# Patient Record
Sex: FEMALE | Race: BLACK OR AFRICAN AMERICAN | Employment: UNEMPLOYED | URBAN - METROPOLITAN AREA
[De-identification: names, ages, dates, MRNs, and addresses within clinical notes are randomized per-mention and may not be internally consistent; named-entity substitution may affect disease eponyms.]

---

## 2017-01-11 ENCOUNTER — APPOINTMENT (OUTPATIENT)
Dept: GENERAL RADIOLOGY | Age: 32
End: 2017-01-11
Attending: PHYSICIAN ASSISTANT
Payer: MEDICAID

## 2017-01-11 ENCOUNTER — HOSPITAL ENCOUNTER (EMERGENCY)
Age: 32
Discharge: LEFT BEFORE DISCHARGE | End: 2017-01-12
Attending: EMERGENCY MEDICINE
Payer: MEDICAID

## 2017-01-11 VITALS
OXYGEN SATURATION: 99 % | BODY MASS INDEX: 33.58 KG/M2 | TEMPERATURE: 98.2 F | WEIGHT: 177.7 LBS | RESPIRATION RATE: 17 BRPM | HEART RATE: 87 BPM | SYSTOLIC BLOOD PRESSURE: 111 MMHG | DIASTOLIC BLOOD PRESSURE: 76 MMHG

## 2017-01-11 LAB
FLUAV AG NPH QL IA: NEGATIVE
FLUBV AG NOSE QL IA: NEGATIVE

## 2017-01-11 PROCEDURE — 87804 INFLUENZA ASSAY W/OPTIC: CPT | Performed by: EMERGENCY MEDICINE

## 2017-01-11 PROCEDURE — 71020 XR CHEST PA LAT: CPT

## 2017-01-11 PROCEDURE — 75810000275 HC EMERGENCY DEPT VISIT NO LEVEL OF CARE

## 2017-01-12 NOTE — ED TRIAGE NOTES
Pt states she has a cold since Sunday. Worried about flu due to body aches and headaches.   States she thinks she feels warm

## 2017-06-04 ENCOUNTER — HOSPITAL ENCOUNTER (EMERGENCY)
Age: 32
Discharge: HOME | End: 2017-06-04
Attending: EMERGENCY MEDICINE
Payer: MEDICAID

## 2017-06-04 VITALS
BODY MASS INDEX: 32.1 KG/M2 | OXYGEN SATURATION: 100 % | WEIGHT: 170 LBS | HEART RATE: 91 BPM | RESPIRATION RATE: 16 BRPM | DIASTOLIC BLOOD PRESSURE: 88 MMHG | HEIGHT: 61 IN | TEMPERATURE: 98.5 F | SYSTOLIC BLOOD PRESSURE: 128 MMHG

## 2017-06-04 DIAGNOSIS — H10.33 ACUTE BACTERIAL CONJUNCTIVITIS OF BOTH EYES: Primary | ICD-10-CM

## 2017-06-04 PROCEDURE — 99282 EMERGENCY DEPT VISIT SF MDM: CPT

## 2017-06-04 RX ORDER — ERYTHROMYCIN 5 MG/G
OINTMENT OPHTHALMIC
Qty: 1 TUBE | Refills: 0 | Status: SHIPPED | OUTPATIENT
Start: 2017-06-04 | End: 2017-06-11

## 2017-06-04 NOTE — LETTER
NOTIFICATION OF RETURN TO WORK / SCHOOL 
 
6/4/2017 Ms. Caroline Grullon [address] [facility] [id number] To Whom It May Concern: 
 
Caroline Grullon was seen in the ED on 6/4/17 and may be excused from work through 6/6/17. Sincerely, April [place], [place]

## 2017-06-05 NOTE — ED PROVIDER NOTES
HPI Comments: [id number] [address] Ortez yr old female presents to the ED complaining of bilateral eye pain and redness since earlier today. Pt denies wearing contact lenses. Denies tx at home. No other complaints. Patient is a [age] Matthew Ortez y.o. female presenting with eye pain. Eye Pain    Associated symptoms include discharge, eye redness, negative and pain. Pertinent negatives include no numbness, no nausea, no vomiting, no weakness, no fever and no dizziness. Past Medical History:   Diagnosis Date    Dental caries     Hypertension     Insect bite     Migraine     Sore throat     Strep pharyngitis     Tooth ache        Past Surgical History:   Procedure Laterality Date    HX  SECTION           Family History:   Problem Relation Age of Onset    Cancer Other     Diabetes Other     Hypertension Other        Social History     Social History    Marital status: SINGLE     Spouse name: N/A    Number of children: N/A    Years of education: N/A     Occupational History    Not on file. Social History Main Topics    Smoking status: Former Smoker    Smokeless tobacco: Never Used    Alcohol use No    Drug use: No    Sexual activity: Yes     Birth control/ protection: None     Other Topics Concern    Not on file     Social History Narrative         ALLERGIES: Review of patient's allergies indicates no known allergies. Review of Systems   Constitutional: Negative. Negative for chills, diaphoresis, fatigue and fever. HENT: Negative. Negative for congestion, ear pain, rhinorrhea and sore throat. Eyes: Positive for pain, discharge and redness. Negative for visual disturbance. Respiratory: Negative. Negative for cough, shortness of breath, wheezing and stridor. Cardiovascular: Negative. Negative for chest pain, palpitations and leg swelling. Gastrointestinal: Negative. Negative for abdominal pain, constipation, diarrhea, nausea and vomiting. Endocrine: Negative. Genitourinary: Negative. Negative for dysuria, flank pain, frequency and hematuria. Musculoskeletal: Negative. Negative for back pain, myalgias, neck pain and neck stiffness. Skin: Negative. Negative for rash and wound. Neurological: Negative. Negative for dizziness, seizures, syncope, weakness, light-headedness, numbness and headaches. Hematological: Negative. Psychiatric/Behavioral: Negative. All other systems reviewed and are negative. There were no vitals filed for this visit. Physical Exam   Constitutional: She is oriented to person, place, and time. She appears well-developed and well-nourished. No distress. HENT:   Head: Normocephalic. Eyes: EOM are normal. Pupils are equal, round, and reactive to light. Right eye exhibits discharge. Left eye exhibits discharge. No scleral icterus. Conjunctiva erythematous bilaterally, bilateral purulent discharge noted   Neck: Normal range of motion. Neck supple. Cardiovascular: Normal rate, regular rhythm and normal heart sounds. Exam reveals no gallop and no friction rub. No murmur heard. Pulmonary/Chest: Effort normal and breath sounds normal. No stridor. No respiratory distress. She has no wheezes. She has no rales. Musculoskeletal: Normal range of motion. Neurological: She is alert and oriented to person, place, and time. Coordination normal.   Gait is steady. Able to ambulate without difficulty. Skin: Skin is warm and dry. No rash noted. She is not diaphoretic. No erythema. Psychiatric: She has a normal mood and affect. Her behavior is normal. Thought content normal.   Nursing note and vitals reviewed. MDM  Number of Diagnoses or Management Options  Diagnosis management comments: Impression:  Conjunctivitis    Vision 20/20    Patient is stable for discharge at this time. Rx for erythromycin given. Rest and follow-up with PCP this week. Return to the ED immediately for any new or worsening sx.   Lois Maciel PA-C 8:25 PM     Risk of Complications, Morbidity, and/or Mortality  Presenting problems: low  Diagnostic procedures: low  Management options: low    Patient Progress  Patient progress: stable    ED Course       Procedures

## 2017-06-05 NOTE — ED TRIAGE NOTES
C/o irritated eyes with discharge onset today. States wearing false lashes prior to onset of symptoms.

## 2017-06-05 NOTE — DISCHARGE INSTRUCTIONS
Pinkeye: Care Instructions  Your Care Instructions    Pinkeye is redness and swelling of the eye surface and the conjunctiva (the lining of the eyelid and the covering of the white part of the eye). Pinkeye is also called conjunctivitis. Pinkeye is often caused by infection with bacteria or a virus. Dry air, allergies, smoke, and chemicals are other common causes. Pinkeye often clears on its own in 7 to 10 days. Antibiotics only help if the pinkeye is caused by bacteria. Pinkeye caused by infection spreads easily. If an allergy or chemical is causing pinkeye, it will not go away unless you can avoid whatever is causing it. Follow-up care is a key part of your treatment and safety. Be sure to make and go to all appointments, and call your doctor if you are having problems. Its also a good idea to know your test results and keep a list of the medicines you take. How can you care for yourself at home? · Wash your hands often. Always wash them before and after you treat pinkeye or touch your eyes or face. · Use moist cotton or a clean, wet cloth to remove crust. Wipe from the inside corner of the eye to the outside. Use a clean part of the cloth for each wipe. · Put cold or warm wet cloths on your eye a few times a day if the eye hurts. · Do not wear contact lenses or eye makeup until the pinkeye is gone. Throw away any eye makeup you were using when you got pinkeye. Clean your contacts and storage case. If you wear disposable contacts, use a new pair when your eye has cleared and it is safe to wear contacts again. · If the doctor gave you antibiotic ointment or eyedrops, use them as directed. Use the medicine for as long as instructed, even if your eye starts looking better soon. Keep the bottle tip clean, and do not let it touch the eye area. · To put in eyedrops or ointment:  ¨ Tilt your head back, and pull your lower eyelid down with one finger.   ¨ Drop or squirt the medicine inside the lower lid.  ¨ Close your eye for 30 to 60 seconds to let the drops or ointment move around. ¨ Do not touch the ointment or dropper tip to your eyelashes or any other surface. · Do not share towels, pillows, or washcloths while you have pinkeye. When should you call for help? Call your doctor now or seek immediate medical care if:  · You have pain in your eye, not just irritation on the surface. · You have a change in vision or loss of vision. · You have an increase in discharge from the eye. · Your eye has not started to improve or begins to get worse within 48 hours after you start using antibiotics. · Pinkeye lasts longer than 7 days. Watch closely for changes in your health, and be sure to contact your doctor if you have any problems. Where can you learn more? Go to http://camelia-[email or web address].info/. Enter Y392 in the search box to learn more about \"Pinkeye: Care Instructions. \"  Current as of: May 27, 2016  Content Version: 11.2  © 6498-6721 [employer]. Care instructions adapted under license by [employer] (which disclaims liability or warranty for this information). If you have questions about a medical condition or this instruction, always ask your healthcare professional. [address] any warranty or liability for your use of this information. wildcraft Activation    Thank you for requesting access to wildcraft. Please follow the instructions below to securely access and download your online medical record. wildcraft allows you to send messages to your doctor, view your test results, renew your prescriptions, schedule appointments, and more. How Do I Sign Up? 1. In your internet browser, go to [email or web address]  2. Click on the First Time User? Click Here link in the Sign In box. You will be redirect to the New Member Sign Up page. 3. Enter your wildcraft Access Code exactly as it appears below.  You will not need to use this code after youve completed the sign-up process. If you do not sign up before the expiration date, you must request a new code. StellaService Access Code: [email or web address]  Expires: 2017  8:27 PM (This is the date your StellaService access code will )    4. Enter the last four digits of your Social Security Number (xxxx) and Date of Birth (mm/dd/yyyy) as indicated and click Submit. You will be taken to the next sign-up page. 5. Create a StellaService ID. This will be your StellaService login ID and cannot be changed, so think of one that is secure and easy to remember. 6. Create a StellaService password. You can change your password at any time. 7. Enter your Password Reset Question and Answer. This can be used at a later time if you forget your password. 8. Enter your e-mail address. You will receive e-mail notification when new information is available in [address]. 9. Click Sign Up. You can now view and download portions of your medical record. 10. Click the Download Summary menu link to download a portable copy of your medical information. Additional Information    If you have questions, please visit the Frequently Asked Questions section of the StellaService website at [email or web address] Remember, StellaService is NOT to be used for urgent needs. For medical emergencies, dial 911.

## 2017-06-08 ENCOUNTER — HOSPITAL ENCOUNTER (EMERGENCY)
Age: 32
Discharge: HOME | End: 2017-06-09
Attending: EMERGENCY MEDICINE
Payer: MEDICAID

## 2017-06-08 VITALS
RESPIRATION RATE: 18 BRPM | BODY MASS INDEX: 32.12 KG/M2 | DIASTOLIC BLOOD PRESSURE: 97 MMHG | SYSTOLIC BLOOD PRESSURE: 153 MMHG | OXYGEN SATURATION: 99 % | TEMPERATURE: 98.3 F | WEIGHT: 170 LBS | HEART RATE: 72 BPM

## 2017-06-08 DIAGNOSIS — J06.9 ACUTE UPPER RESPIRATORY INFECTION: Primary | ICD-10-CM

## 2017-06-08 DIAGNOSIS — J02.9 PHARYNGITIS, UNSPECIFIED ETIOLOGY: ICD-10-CM

## 2017-06-08 DIAGNOSIS — K02.9 DENTAL CARIES: ICD-10-CM

## 2017-06-08 PROCEDURE — 87077 CULTURE AEROBIC IDENTIFY: CPT | Performed by: EMERGENCY MEDICINE

## 2017-06-08 PROCEDURE — 87081 CULTURE SCREEN ONLY: CPT | Performed by: EMERGENCY MEDICINE

## 2017-06-08 PROCEDURE — 99282 EMERGENCY DEPT VISIT SF MDM: CPT

## 2017-06-09 RX ORDER — AMOXICILLIN 500 MG/1
500 TABLET, FILM COATED ORAL 3 TIMES DAILY
Qty: 30 TAB | Refills: 0 | Status: SHIPPED | OUTPATIENT
Start: 2017-06-09 | End: 2017-08-31

## 2017-06-09 RX ORDER — FLUTICASONE PROPIONATE 50 MCG
2 SPRAY, SUSPENSION (ML) NASAL DAILY
Qty: 1 BOTTLE | Refills: 0 | Status: SHIPPED | OUTPATIENT
Start: 2017-06-09 | End: 2017-08-31

## 2017-06-09 NOTE — DISCHARGE INSTRUCTIONS
Shopcliq Activation    Thank you for requesting access to Shopcliq. Please follow the instructions below to securely access and download your online medical record. Shopcliq allows you to send messages to your doctor, view your test results, renew your prescriptions, schedule appointments, and more. How Do I Sign Up? 1. In your internet browser, go to [email or web address]  2. Click on the First Time User? Click Here link in the Sign In box. You will be redirect to the New Member Sign Up page. 3. Enter your Shopcliq Access Code exactly as it appears below. You will not need to use this code after youve completed the sign-up process. If you do not sign up before the expiration date, you must request a new code. Shopcliq Access Code: [email or web address]  Expires: 2017  8:27 PM (This is the date your Shopcliq access code will )    4. Enter the last four digits of your Social Security Number (xxxx) and Date of Birth (mm/dd/yyyy) as indicated and click Submit. You will be taken to the next sign-up page. 5. Create a Shopcliq ID. This will be your Shopcliq login ID and cannot be changed, so think of one that is secure and easy to remember. 6. Create a Shopcliq password. You can change your password at any time. 7. Enter your Password Reset Question and Answer. This can be used at a later time if you forget your password. 8. Enter your e-mail address. You will receive e-mail notification when new information is available in [address] 9. Click Sign Up. You can now view and download portions of your medical record. 10. Click the Download Summary menu link to download a portable copy of your medical information. Additional Information    If you have questions, please visit the Frequently Asked Questions section of the Shopcliq website at [email or web address] Remember, Shopcliq is NOT to be used for urgent needs. For medical emergencies, dial 911.

## 2017-06-09 NOTE — ED PROVIDER NOTES
HPI Comments: 28 yr old female presents to the ED complaining of a sore throat for the past few days and intermittent dental pain in the lower L molar region for the past several months. Denies fever, chills, SOB, and chest pain. Pt reports some mild nasal congestion. No other complaints. Patient is a 28 y.o. female presenting with sore throat and dental problem. Sore Throat    Associated symptoms include congestion. Pertinent negatives include no diarrhea, no vomiting, no ear pain, no headaches, no shortness of breath, no stridor and no cough. Dental Pain             Past Medical History:   Diagnosis Date    Dental caries     Hypertension     Insect bite     Migraine     Sore throat     Strep pharyngitis     Tooth ache        Past Surgical History:   Procedure Laterality Date    HX  SECTION           Family History:   Problem Relation Age of Onset    Cancer Other     Diabetes Other     Hypertension Other        Social History     Social History    Marital status: SINGLE     Spouse name: N/A    Number of children: N/A    Years of education: N/A     Occupational History    Not on file. Social History Main Topics    Smoking status: Former Smoker    Smokeless tobacco: Never Used    Alcohol use No    Drug use: No    Sexual activity: Yes     Birth control/ protection: None     Other Topics Concern    Not on file     Social History Narrative         ALLERGIES: Review of patient's allergies indicates no known allergies. Review of Systems   Constitutional: Negative. Negative for chills, diaphoresis, fatigue and fever. HENT: Positive for congestion, dental problem and sore throat. Negative for ear pain and rhinorrhea. Eyes: Negative. Negative for pain, redness and visual disturbance. Respiratory: Negative. Negative for cough, shortness of breath, wheezing and stridor. Cardiovascular: Negative. Negative for chest pain, palpitations and leg swelling.    Gastrointestinal: Negative. Negative for abdominal pain, constipation, diarrhea, nausea and vomiting. Endocrine: Negative. Genitourinary: Negative. Negative for dysuria, flank pain, frequency and hematuria. Musculoskeletal: Negative. Negative for back pain, myalgias, neck pain and neck stiffness. Skin: Negative. Negative for rash and wound. Allergic/Immunologic: Negative. Neurological: Negative. Negative for dizziness, seizures, syncope, weakness, light-headedness, numbness and headaches. Hematological: Negative. Psychiatric/Behavioral: Negative. All other systems reviewed and are negative. Vitals:    06/08/17 2344   BP: (!) 153/97   Pulse: 72   Resp: 18   Temp: 98.3 °F (36.8 °C)   SpO2: 99%   Weight: 77.1 kg (170 lb)            Physical Exam   Constitutional: She is oriented to person, place, and time. She appears well-developed and well-nourished. No distress. HENT:   Head: Normocephalic. Mouth/Throat: No oropharyngeal exudate. Oropharynx erythematous without exudates, airway is patent, able to clear secretions, dental caries noted in the lower L molar region without definite abscess noted. Neck: Normal range of motion. Neck supple. Cardiovascular: Normal rate, regular rhythm and normal heart sounds. Exam reveals no gallop and no friction rub. No murmur heard. Pulmonary/Chest: Effort normal and breath sounds normal. No stridor. No respiratory distress. She has no wheezes. She has no rales. Musculoskeletal: Normal range of motion. Neurological: She is alert and oriented to person, place, and time. Coordination normal.   Gait is steady. Able to ambulate without difficulty. Skin: Skin is warm and dry. No rash noted. She is not diaphoretic. No erythema. Psychiatric: She has a normal mood and affect. Her behavior is normal. Thought content normal.   Nursing note and vitals reviewed.        MDM  Number of Diagnoses or Management Options  Diagnosis management comments: Impression: Pharyngitis, dental caries, URI    RST negative    Patient is stable for discharge at this time. Rx for amoxicillin and flonase given. Rest and follow-up with PCP this week. Return to the ED immediately for any new or worsening sx.   Lois Maciel PA-C 12:34 AM        Amount and/or Complexity of Data Reviewed  Clinical lab tests: reviewed and ordered    Risk of Complications, Morbidity, and/or Mortality  Presenting problems: low  Diagnostic procedures: low  Management options: low    Patient Progress  Patient progress: stable    ED Course       Procedures

## 2017-06-11 LAB
B-HEM STREP THROAT QL CULT: NEGATIVE
BACTERIA SPEC CULT: ABNORMAL
BACTERIA SPEC CULT: ABNORMAL
SERVICE CMNT-IMP: ABNORMAL

## 2017-08-31 ENCOUNTER — HOSPITAL ENCOUNTER (EMERGENCY)
Age: 32
Discharge: HOME | End: 2017-08-31
Attending: EMERGENCY MEDICINE
Payer: MEDICAID

## 2017-08-31 VITALS
OXYGEN SATURATION: 100 % | HEIGHT: 61 IN | BODY MASS INDEX: 33.23 KG/M2 | HEART RATE: 90 BPM | WEIGHT: 176 LBS | RESPIRATION RATE: 18 BRPM | TEMPERATURE: 98.6 F | DIASTOLIC BLOOD PRESSURE: 90 MMHG | SYSTOLIC BLOOD PRESSURE: 140 MMHG

## 2017-08-31 DIAGNOSIS — N63.10 PAINFUL LUMPY RIGHT BREAST: Primary | ICD-10-CM

## 2017-08-31 DIAGNOSIS — N64.4 PAINFUL LUMPY RIGHT BREAST: Primary | ICD-10-CM

## 2017-08-31 PROCEDURE — 99282 EMERGENCY DEPT VISIT SF MDM: CPT

## 2017-08-31 RX ORDER — IBUPROFEN 800 MG/1
800 TABLET ORAL
Qty: 20 TAB | Refills: 0 | Status: SHIPPED | OUTPATIENT
Start: 2017-08-31 | End: 2017-09-07

## 2017-08-31 NOTE — ED PROVIDER NOTES
HPI Comments: Pt is a 27 yo female with a PMH of HTN presents to the ED for right breast pain. Pt noted a lumpy area in the right breast 6 days ago, and then the area became painful the next day. She saw her PCP 4 days ago, was examined, and is scheduled for a mammogram and ultrasound of the breast on 2017. She is here today because she is worried and wanted to be \"checked out again\". 8/10 pain in the breast. She has not tried anything for the pain. Pt denies any fever, chills, breast erythema/skin changes/warmth or discharge. Patient is a 28 y.o. female presenting with breast pain. The history is provided by the patient. Breast pain           Past Medical History:   Diagnosis Date    Dental caries     Hypertension     Insect bite     Migraine     Sore throat     Strep pharyngitis     Tooth ache        Past Surgical History:   Procedure Laterality Date    HX  SECTION           Family History:   Problem Relation Age of Onset    Cancer Other     Diabetes Other     Hypertension Other        Social History     Social History    Marital status: SINGLE     Spouse name: N/A    Number of children: N/A    Years of education: N/A     Occupational History    Not on file. Social History Main Topics    Smoking status: Former Smoker    Smokeless tobacco: Never Used    Alcohol use No    Drug use: No    Sexual activity: Yes     Birth control/ protection: None     Other Topics Concern    Not on file     Social History Narrative         ALLERGIES: Review of patient's allergies indicates no known allergies. Review of Systems   Constitutional: Negative for chills and fever. HENT: Negative for ear pain, rhinorrhea and sore throat. Eyes: Negative for pain and redness. Respiratory: Negative for cough and shortness of breath. Cardiovascular: Negative for chest pain. Gastrointestinal: Negative for abdominal pain, constipation, diarrhea, nausea and vomiting.    Genitourinary: Negative for dysuria. Breast pain   Musculoskeletal: Negative for gait problem, myalgias, neck pain and neck stiffness. Skin: Negative. Neurological: Negative for dizziness, light-headedness and headaches. Psychiatric/Behavioral: Negative. Vitals:    08/31/17 1537   BP: 140/90   Pulse: 90   Resp: 18   Temp: 98.6 °F (37 °C)   SpO2: 100%   Weight: 79.8 kg (176 lb)   Height: 5' 1\" (1.549 m)            Physical Exam   Constitutional: She is oriented to person, place, and time. She appears well-developed and well-nourished. No distress. HENT:   Head: Normocephalic and atraumatic. Right Ear: Tympanic membrane, external ear and ear canal normal.   Left Ear: Tympanic membrane, external ear and ear canal normal.   Nose: Nose normal.   Mouth/Throat: Oropharynx is clear and moist and mucous membranes are normal.   Eyes: Conjunctivae and EOM are normal. Pupils are equal, round, and reactive to light. Neck: Normal range of motion. Neck supple. Cardiovascular: Normal rate, regular rhythm, normal heart sounds and intact distal pulses. Exam reveals no gallop and no friction rub. No murmur heard. Pulmonary/Chest: Effort normal and breath sounds normal. No respiratory distress. She has no wheezes. She has no rales. Right breast exhibits tenderness. Right breast exhibits no inverted nipple, no nipple discharge and no skin change. Left breast exhibits no inverted nipple, no mass, no nipple discharge, no skin change and no tenderness. No fluctuance or induration noted. Abdominal: Soft. Bowel sounds are normal. There is no tenderness. Musculoskeletal: Normal range of motion. Neurological: She is alert and oriented to person, place, and time. Skin: Skin is warm and dry. No rash noted. She is not diaphoretic. Psychiatric: She has a normal mood and affect. Her behavior is normal. Judgment and thought content normal.   Nursing note and vitals reviewed.        MDM  Number of Diagnoses or Management Options  Painful lumpy right breast: new and requires workup  Diagnosis management comments: DDX: Fibrocystic change, Breast Cancer, Fibroadenoma, lipoma, abscess, cellulitis, intraductal papilloma    Exam is not c/w abscess or cellulitis or mastitis. Pt scheduled for mammogram and US 9/12/17. Do not feel there is an indication for emergent imagine. D/w different possible causes of breast lumps, and that her scheduled imaging is most appropriate, and stressed the importance of keeping that appt. Will refer to OBGYN as well. Pt given strict ED return precautions. Pt indicated understanding. Pt results have been reviewed with them. They have been counseled regarding diagnosis, treatment, and plan. Pt verbally conveys understanding and agreement of the signs, symptoms, diagnosis, treatment and prognosis and additionally agrees to follow up as discussed. Pt also agrees with the care-plan and conveys that all of their questions have been answered. I have also provided discharge instructions for them that include: educational information regarding their diagnosis and treatment, and list of reasons why they would want to return to the ED prior to their follow-up appointment, should their condition change. Brant Durbin PA-C 4:12 PM        Amount and/or Complexity of Data Reviewed  Review and summarize past medical records: yes  Discuss the patient with other providers: yes    Risk of Complications, Morbidity, and/or Mortality  Presenting problems: low  Diagnostic procedures: low  Management options: low    Patient Progress  Patient progress: stable    ED Course       Procedures      Diagnosis:   1. Painful lumpy right breast          Disposition: Discharge to home.      Follow-up Information     Follow up With Details Comments [facility]  As needed, If symptoms worsen [place] [id number] [phone]    [address] [place] [address] Go in 2 days Keep your imaging appointment as scheduled 9/12/2017 [facility] Nigel Parra MD Go in 1 week  [place]  [phone]            Patient's Medications   Start Taking    IBUPROFEN (MOTRIN) 800 MG TABLET    Take 1 Tab by mouth every six (6) hours as needed for Pain for up to 7 days. Continue Taking    HYDROCHLOROTHIAZIDE PO    Take  by mouth. These Medications have changed    No medications on file   Stop Taking    AMOXICILLIN 500 MG TAB    Take 500 mg by mouth three (3) times daily. FLUTICASONE (FLONASE) 50 MCG/ACTUATION NASAL SPRAY    2 Sprays by Both Nostrils route daily.

## 2017-08-31 NOTE — Clinical Note
Take motrin as prescribed as needed for pain. Keep your imaging appointment as scheduled on 9/12/2017, follow up with your primary doctor and/or OBGYN. Return for any concerns, worsening, or as needed.

## 2017-08-31 NOTE — ED TRIAGE NOTES
C/o    palpable lump to right breast that she noticed on Friday. States she saw her dr and is awaiting mammogram appt, but family is concerned and pt states that she became concerned when she started having pain to right breast  on Saturday. Denies any nipple discharge. Pt states she has also been having right lower back pain x 2 weeks. Denies injury or heavy lifting.

## 2017-12-08 NOTE — ED NOTES
Discharge instructions reviewed with patient. Patient voices understanding. Patient advised to follow up as directed on discharge instructions. Patient denies questions, needs or concerns at discharge regarding discharge instructions. Advised patient of need to update demographic information with registration prior to departing ER. Patient voiced understanding. No s/sx of distress noted. Armband removed and placed in shredder box.
Visual Acuity uncorrected:   Both 20/20, Right 20/20, left 20/20
monthly or less

## 2018-12-01 ENCOUNTER — HOSPITAL ENCOUNTER (EMERGENCY)
Age: 33
Discharge: HOME | End: 2018-12-01
Attending: EMERGENCY MEDICINE
Payer: COMMERCIAL

## 2018-12-01 VITALS
SYSTOLIC BLOOD PRESSURE: 139 MMHG | OXYGEN SATURATION: 98 % | RESPIRATION RATE: 20 BRPM | TEMPERATURE: 98.7 F | DIASTOLIC BLOOD PRESSURE: 90 MMHG | HEART RATE: 86 BPM

## 2018-12-01 DIAGNOSIS — K08.89 PAIN, DENTAL: Primary | ICD-10-CM

## 2018-12-01 PROCEDURE — 99282 EMERGENCY DEPT VISIT SF MDM: CPT

## 2018-12-01 RX ORDER — HYDROCODONE BITARTRATE AND ACETAMINOPHEN 5; 325 MG/1; MG/1
1 TABLET ORAL
Qty: 16 TAB | Refills: 0 | Status: SHIPPED | OUTPATIENT
Start: 2018-12-01 | End: 2020-08-03

## 2018-12-01 RX ORDER — AMOXICILLIN 500 MG/1
500 TABLET, FILM COATED ORAL 3 TIMES DAILY
Qty: 30 TAB | Refills: 0 | Status: SHIPPED | OUTPATIENT
Start: 2018-12-01 | End: 2018-12-11

## 2018-12-01 NOTE — ED NOTES
I have reviewed discharge instructions with the patient. The patient verbalized understanding. Patient armband removed and given to patient to take home. Patient was informed of the privacy risks if armband lost or stolen Current Discharge Medication List  
  
START taking these medications Details HYDROcodone-acetaminophen (NORCO) 5-325 mg per tablet Take 1 Tab by mouth every six (6) hours as needed for Pain. Max Daily Amount: 4 Tabs. Qty: 16 Tab, Refills: 0 Associated Diagnoses: Pain, dental  
  
amoxicillin 500 mg tab Take 500 mg by mouth three (3) times daily for 10 days. Qty: 30 Tab, Refills: 0 CONTINUE these medications which have NOT CHANGED Details HYDROCHLOROTHIAZIDE PO Take  by mouth.

## 2018-12-01 NOTE — DISCHARGE INSTRUCTIONS
Please follow-up with one of the dental clinics listed below:    [facility] [phone]  [facility] [phone]  [facility] [phone] (For Extractions Only)  [address] [phone]  [facility] [phone] [address] [phone]    Call to schedule an appointment. Periodontal Conditions: Care Instructions  Your Care Instructions    Periodontal conditions affect the gums, bone, and tissue that surround and support the teeth. The most common problems are caused by plaque. Plaque is a thin film of bacteria that sticks to teeth above and below the gum line. It can build up and harden into tartar. The bacteria in plaque and tartar can cause gum disease. Gingivitis is a disease that affects the gums (gingiva). The gums are the soft tissue that surrounds the teeth. Gingivitis causes red, swollen, tender gums that bleed easily when brushed, persistent bad breath, and sensitive teeth. Because it is not painful, many people do not get treatment when they should. Gingivitis can be reversed with good dental care. Periodontitis is a more advanced disease that affects more than the gums. The gums pull away from the teeth. This leaves deep pockets where bacteria can grow. The disease can damage the bones that support the teeth. The teeth may get loose and fall out. A periodontal condition should be treated as soon as it is found. Finding gum problems early, treating them right away, and having regular checkups bring the best results. You can treat mild periodontal conditions by brushing and flossing your teeth every day. Your dentist may prescribe a mouthwash to kill the bacteria that can damage teeth and gums. Your dentist may have you take antibiotics to treat infection from moderate periodontal disease. If your gums have pulled away from your teeth, you may need cleaning between the teeth and gums right down to the teeth roots.  This is called root planing and scaling. If you have severe periodontal disease, you may need surgery to remove diseased gum tissue or repair bone damage. Follow-up care is a key part of your treatment and safety. Be sure to make and go to all appointments, and call your dentist if you are having problems. It's also a good idea to know your test results and keep a list of the medicines you take. How can you care for yourself at home? · If your dentist prescribed antibiotics, take them as directed. Do not stop taking them just because you feel better. You need to take the full course of antibiotics. · Brush your teeth twice a day, in the morning and at night. ? Use a toothbrush with soft, rounded-end bristles and a head that is small enough to reach all parts of your teeth and mouth. Replace your toothbrush every 3 to 4 months. ? Use a fluoride toothpaste. ? Place the brush at a 45-degree angle where the teeth meet the gums. Press firmly, and gently rock the brush back and forth using small circular movements. ? Brush chewing surfaces vigorously with short back-and-forth strokes. ? Brush your tongue from back to front. · Floss at least once a day. Choose the type and flavor that you like best.  · Have your teeth cleaned by a professional at least twice a year. · Ask your dentist about using an antibacterial mouthwash to help reduce bacteria. · Rinse your mouth with water or chew sugar-free gum after meals if you can't brush your teeth. · Do not smoke or use smokeless tobacco. Tobacco use can cause periodontal disease. When should you call for help? Call your dentist now or seek immediate medical care if:    · You have symptoms of infection, such as:  ? Increased pain, swelling, warmth, or redness. ? Red streaks leading from the area. ? Pus draining from the area.   ? A fever.    Watch closely for changes in your health, and be sure to contact your dentist if:    · You have new or worse tooth pain.     · You do not get better as expected. Where can you learn more? Go to http://camelia-shanna.info/. Enter R288 in the search box to learn more about \"Periodontal Conditions: Care Instructions. \"  Current as of: March 28, 2018  Content Version: 11.8  © 0059-8435 [employer]wise, [employer]. Care instructions adapted under license by [employer] (which disclaims liability or warranty for this information). If you have questions about a medical condition or this instruction, always ask your healthcare professional. [address] any warranty or liability for your use of this information.

## 2018-12-01 NOTE — ED PROVIDER NOTES
EMERGENCY DEPARTMENT HISTORY AND PHYSICAL EXAM 
 
4:58 PM 
 
 
Date: 2018 Patient Name: Corby Ordonez History of Presenting Illness Chief Complaint Patient presents with  Dental Pain History Provided By: Patient Chief Complaint: dental pain Duration:  Days Timing:  Acute Location: left upper Quality: Aching Severity: Moderate Modifying Factors: none Associated Symptoms: denies any other associated signs or symptoms Additional History (Context):Renetta Das is a 29 y.o. female who presents to the emergency department for evaluation of left upper dental pain x 1 day. No recent dental injury. No improvement with Tylenol #3, hydrocodone, and packing it with BC powder and orajel. She has also tried motrin without any relief. Pt does have a dentist.  Pt denies any fevers or chills, headache, dizziness or light headedness, ENT issues, CP or discomfort, SOB, cough, n/v/d/c, abd pain, back pain, diaphoresis, melena/hematochezia, dysuria, hematuria, frequency, focal weakness/numbness/tingling, or rash. Patient has no other complaints at this time. PCP:  Nella Chairez MD 
 
 
Current Outpatient Medications Medication Sig Dispense Refill  
 HYDROcodone-acetaminophen (NORCO) 5-325 mg per tablet Take 1 Tab by mouth every six (6) hours as needed for Pain. Max Daily Amount: 4 Tabs. 16 Tab 0  
 amoxicillin 500 mg tab Take 500 mg by mouth three (3) times daily for 10 days. 30 Tab 0  
 HYDROCHLOROTHIAZIDE PO Take  by mouth. Past History Past Medical History: 
Past Medical History:  
Diagnosis Date  Dental caries  Hypertension  Insect bite  Migraine  Sore throat  Strep pharyngitis  Tooth ache Past Surgical History: 
Past Surgical History:  
Procedure Laterality Date  HX  SECTION Family History: 
Family History Problem Relation Age of Onset  Cancer Other  Diabetes Other  Hypertension Other Social History: 
Social History Tobacco Use  Smoking status: Former Smoker  Smokeless tobacco: Never Used Substance Use Topics  Alcohol use: No  
 Drug use: No  
 
 
Allergies: 
No Known Allergies Review of Systems Review of Systems Constitutional: Negative for chills and fever. HENT: Positive for dental problem. Negative for congestion, rhinorrhea and sore throat. Respiratory: Negative for cough and shortness of breath. Cardiovascular: Negative for chest pain. Gastrointestinal: Negative for abdominal pain, blood in stool, constipation, diarrhea, nausea and vomiting. Genitourinary: Negative for dysuria, frequency and hematuria. Musculoskeletal: Negative for back pain and myalgias. Skin: Negative for rash and wound. Neurological: Negative for dizziness and headaches. Physical Exam  
 
Visit Vitals /90 (BP 1 Location: Left arm) Pulse 86 Temp 98.7 °F (37.1 °C) Resp 20 LMP 11/26/2018 SpO2 98% Physical Exam  
Constitutional: She is oriented to person, place, and time. She appears well-developed and well-nourished. No distress. HENT:  
Head: Normocephalic and atraumatic. Diffusely poor dentition. No localized induration or fluctuance to suggest drainable abscess. No sublingual/submandibular induration, trismus, or stridor. Normal speech. Handling oral secretions without difficulty. Pt with full ROM of neck. Eyes: Conjunctivae are normal. Right eye exhibits no discharge. Left eye exhibits no discharge. Neck: Normal range of motion. Neck supple. No thyromegaly present. Cardiovascular: Normal rate, regular rhythm and normal heart sounds. Pulmonary/Chest: Effort normal and breath sounds normal. No respiratory distress. She has no wheezes. She has no rales. She exhibits no tenderness. Musculoskeletal: She exhibits no edema or deformity. Lymphadenopathy:  
  She has no cervical adenopathy. Neurological: She is alert and oriented to person, place, and time. She has normal reflexes. Skin: Skin is warm and dry. She is not diaphoretic. Psychiatric: She has a normal mood and affect. Nursing note and vitals reviewed. Diagnostic Study Results Labs - No results found for this or any previous visit (from the past 12 hour(s)). Radiologic Studies - No results found. Medical Decision Making I am the first provider for this patient. I reviewed the vital signs, available nursing notes, past medical history, past surgical history, family history and social history. Vital Signs-Reviewed the patient's vital signs. Pulse Oximetry Analysis -  98% on room air (Interpretation) Records Reviewed: Nursing Notes (Time of Review: 4:58 PM) ED Course: Progress Notes, Reevaluation, and Consults: 
 
Provider Notes (Medical Decision Making):  
Differential Diagnosis:  Dentalgia, dental caries, dental fracture, periodontal abscess/cellulitis, gingivitis, facial abscess/cellulitis Plan:  Pt presents ambulatory in NAD, well-hydrated, non-toxic in appearance, with normal vitals. Exam reveals diffusely poor dentition without localized induration or fluctuance to suggest drainable abscess. No sublingual/submandibular induration, trismus, or stridor. Normal speech. Handling oral secretions without difficulty. Pt with full ROM of neck. Low suspicion for Rogelio's angina or deep space infection. Will DC home with norco, amoxicillin  Pt is strongly advised to follow-up with dentist in short period of time as they will need definitive management. At this time, patient is stable and appropriate for discharge home. Patient demonstrates understanding of current diagnoses and is in agreement with the treatment plan.   They are advised that while the likelihood of serious underlying condition is low at this point given the evaluation performed today, we cannot fully rule it out. They are advised to immediately return with any new symptoms or worsening of current condition. All questions have been answered. Patient is given educational material regarding their diagnoses, including danger symptoms and when to return to the ED. Follow-up with PCP. Diagnosis Clinical Impression: 1. Pain, dental   
 
Disposition: [address] Follow-up Information Follow up With Specialties Details Why Contact Info [address]  Call in 2 days  [address] [address] 
[phone] [address] [facility] EMERGENCY DEPT Emergency Medicine Go to As needed, If symptoms worsen [address] [id number] [phone] Medication List  
  
START taking these medications   
amoxicillin 500 mg Tab Take 500 mg by mouth three (3) times daily for 10 days. HYDROcodone-acetaminophen 5-325 mg per tablet Commonly known as:  Tj Oakley Take 1 Tab by mouth every six (6) hours as needed for Pain. Max Daily Amount: 4 Tabs. CONTINUE taking these medications HYDROCHLOROTHIAZIDE PO Where to Get Your Medications Information about where to get these medications is not yet available Ask your nurse or doctor about these medications · amoxicillin 500 mg Tab 
· HYDROcodone-acetaminophen 5-325 mg per tablet 
  
 
_______________________________

## 2019-05-06 ENCOUNTER — HOSPITAL ENCOUNTER (EMERGENCY)
Age: 34
Discharge: HOME | End: 2019-05-06
Attending: EMERGENCY MEDICINE
Payer: MEDICAID

## 2019-05-06 VITALS
TEMPERATURE: 97.7 F | HEART RATE: 90 BPM | OXYGEN SATURATION: 100 % | DIASTOLIC BLOOD PRESSURE: 99 MMHG | SYSTOLIC BLOOD PRESSURE: 139 MMHG | RESPIRATION RATE: 18 BRPM

## 2019-05-06 DIAGNOSIS — M79.18 MUSCULOSKELETAL PAIN: Primary | ICD-10-CM

## 2019-05-06 LAB
APPEARANCE UR: CLEAR
BILIRUB UR QL: NEGATIVE
COLOR UR: YELLOW
GLUCOSE UR STRIP.AUTO-MCNC: NEGATIVE MG/DL
HCG UR QL: NEGATIVE
HGB UR QL STRIP: NEGATIVE
KETONES UR QL STRIP.AUTO: NEGATIVE MG/DL
LEUKOCYTE ESTERASE UR QL STRIP.AUTO: NEGATIVE
NITRITE UR QL STRIP.AUTO: NEGATIVE
PH UR STRIP: 6 [PH] (ref 5–8)
PROT UR STRIP-MCNC: NEGATIVE MG/DL
SP GR UR REFRACTOMETRY: 1.01 (ref 1–1.03)
UROBILINOGEN UR QL STRIP.AUTO: 0.2 EU/DL (ref 0.2–1)

## 2019-05-06 PROCEDURE — 99282 EMERGENCY DEPT VISIT SF MDM: CPT

## 2019-05-06 PROCEDURE — 81003 URINALYSIS AUTO W/O SCOPE: CPT

## 2019-05-06 PROCEDURE — 81025 URINE PREGNANCY TEST: CPT

## 2019-05-06 RX ORDER — IBUPROFEN 800 MG/1
800 TABLET ORAL
Qty: 20 TAB | Refills: 0 | Status: SHIPPED | OUTPATIENT
Start: 2019-05-06 | End: 2019-05-13

## 2019-05-06 RX ORDER — METHOCARBAMOL 500 MG/1
500 TABLET, FILM COATED ORAL 4 TIMES DAILY
Qty: 20 TAB | Refills: 0 | Status: SHIPPED | OUTPATIENT
Start: 2019-05-06 | End: 2020-08-03

## 2019-05-07 NOTE — DISCHARGE INSTRUCTIONS
Patient armband removed and shredded  . DISCHARGE SUMMARY from Nurse    PATIENT INSTRUCTIONS:    After general anesthesia or intravenous sedation, for 24 hours or while taking prescription Narcotics:  · Limit your activities  · Do not drive and operate hazardous machinery  · Do not make important personal or business decisions  · Do  not drink alcoholic beverages  · If you have not urinated within 8 hours after discharge, please contact your surgeon on call. Report the following to your surgeon:  · Excessive pain, swelling, redness or odor of or around the surgical area  · Temperature over 100.5  · Nausea and vomiting lasting longer than 4 hours or if unable to take medications  · Any signs of decreased circulation or nerve impairment to extremity: change in color, persistent  numbness, tingling, coldness or increase pain  · Any questions    What to do at Home:  Recommended activity: Activity as tolerated,     If you experience any of the following symptoms increasing pain, fever SOB, [facility]. *  Please give a list of your current medications to your Primary Care Provider. *  Please update this list whenever your medications are discontinued, doses are      changed, or new medications (including over-the-counter products) are added. *  Please carry medication information at all times in case of emergency situations. These are general instructions for a healthy lifestyle:    No smoking/ No tobacco products/ Avoid exposure to second hand smoke  Surgeon General's Warning:  Quitting smoking now greatly reduces serious risk to your health.     Obesity, smoking, and sedentary lifestyle greatly increases your risk for illness    A healthy diet, regular physical exercise & weight monitoring are important for maintaining a healthy lifestyle    You may be retaining fluid if you have a history of heart failure or if you experience any of the following symptoms:  Weight gain of 3 pounds or more overnight or 5 pounds in a week, increased swelling in our hands or feet or shortness of breath while lying flat in bed. Please call your doctor as soon as you notice any of these symptoms; do not wait until your next office visit. Recognize signs and symptoms of STROKE:    F-face looks uneven    A-arms unable to move or move unevenly    S-speech slurred or non-existent    T-time-call 911 as soon as signs and symptoms begin-DO NOT go       Back to bed or wait to see if you get better-TIME IS BRAIN. Warning Signs of HEART ATTACK     Call 911 if you have these symptoms:   Chest discomfort. Most heart attacks involve discomfort in the center of the chest that lasts more than a few minutes, or that goes away and comes back. It can feel like uncomfortable pressure, squeezing, fullness, or pain.  Discomfort in other areas of the upper body. Symptoms can include pain or discomfort in one or both arms, the back, neck, jaw, or stomach.  Shortness of breath with or without chest discomfort.  Other signs may include breaking out in a cold sweat, nausea, or lightheadedness. Don't wait more than five minutes to call 911 - MINUTES MATTER! Fast action can save your life. Calling 911 is almost always the fastest way to get lifesaving treatment. Emergency Medical Services staff can begin treatment when they arrive -- up to an hour sooner than if someone gets to the hospital by car. The discharge information has been reviewed with the patient. The patient verbalized understanding. Discharge medications reviewed with the patient and appropriate educational materials and side effects teaching were provided.   ___________________________________________________________________________________________________________________________________
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
## 2019-05-27 ENCOUNTER — HOSPITAL ENCOUNTER (EMERGENCY)
Age: 34
Discharge: STILL ADMITTED OR ADMINISTRATIVE | End: 2019-05-27
Attending: EMERGENCY MEDICINE
Payer: MEDICAID

## 2019-05-27 PROCEDURE — 75810000275 HC EMERGENCY DEPT VISIT NO LEVEL OF CARE

## 2019-05-28 NOTE — ED PROVIDER NOTES
Per nurses notes, patient 'arrived in error.'    Junior Bland M.D. JOAO Board Certified Emergency Physician    Provider Attestation:  If a scribe was utilized in generation of this patient record, I personally performed the services described in the documentation, reviewed the documentation, as recorded by the scribe in my presence, and it accurately records the patient's history of presenting illness, review of systems, patient physical examination, and procedures performed by me as the attending physician. Kayden Bland M.D. JOAO Board Certified Emergency Physician  5/27/2019.

## 2019-08-28 ENCOUNTER — HOSPITAL ENCOUNTER (OUTPATIENT)
Dept: MAMMOGRAPHY | Age: 34
Discharge: HOME | End: 2019-08-28
Payer: MEDICAID

## 2019-08-28 ENCOUNTER — HOSPITAL ENCOUNTER (OUTPATIENT)
Dept: ULTRASOUND IMAGING | Age: 34
Discharge: HOME | End: 2019-08-28
Payer: MEDICAID

## 2019-08-28 DIAGNOSIS — N64.4 BREAST PAIN: ICD-10-CM

## 2019-08-28 PROCEDURE — 77062 BREAST TOMOSYNTHESIS BI: CPT

## 2019-12-19 ENCOUNTER — HOSPITAL ENCOUNTER (OUTPATIENT)
Dept: PHYSICAL THERAPY | Age: 34
End: 2019-12-19
Payer: MEDICAID

## 2019-12-30 ENCOUNTER — HOSPITAL ENCOUNTER (OUTPATIENT)
Dept: PHYSICAL THERAPY | Age: 34
Discharge: HOME | End: 2019-12-30
Payer: MEDICAID

## 2019-12-30 PROCEDURE — 97110 THERAPEUTIC EXERCISES: CPT

## 2019-12-30 PROCEDURE — 97161 PT EVAL LOW COMPLEX 20 MIN: CPT

## 2019-12-30 NOTE — PROGRESS NOTES
PT DAILY TREATMENT NOTE     Patient Name: Sabra English  Date:2019  : 1985  [x]  Patient  Verified  Payor: BLUE CROSS MEDICAID / Plan: [facility] [place] Dates / Product Type: Managed Care Medicaid /    In time:300  Out time:340  Total Treatment Time (min): 40  Total Timed Codes (min): 10  1:1 Treatment Time (MC only): 40   Visit #: 1 of 6    Treatment Area: Low back pain [M54.5]  Sciatica, right side [M54.31]    SUBJECTIVE  Pain Level (0-10 scale): 8  Any medication changes, allergies to medications, adverse drug reactions, diagnosis change, or new procedure performed?: [x] No    [] Yes (see summary sheet for update)  Subjective functional status:   [x] See Eval form in paper chart      OBJECTIVE    30 min [x]Eval                  []Re-Eval       10 min Therapeutic Exercise:  [x] See flow sheet :HEP   Rationale: increase ROM, increase strength, improve coordination, improve balance and increase proprioception to improve the patients ability to perform ADLs. With   [] TE   [] TA   [] neuro   [] other: Patient Education: [x] Review HEP    [] Progressed/Changed HEP based on:   [] positioning   [] body mechanics   [] transfers   [] heat/ice application    [] other:                  Pain Level (0-10 scale) post treatment: 4    ASSESSMENT:   [x]  See Evaluation         Goals:  Short Term Goals: To be accomplished in 1 weeks:  1. Therapist to establish HEP for ROM & Strengthening to improve ease with gait & ADLs. Long Term Goals: To be accomplished in 4 weeks:  1. Patient will be independent with HEP to improve carryover of functional gains with ADLs between visits. Eval Status:n/a  2. Pt will decrease average pain rating on VAS to <4/10 to improve ease with mobility/ADLs. Eval Status:4-10/10  3. . Pt will demonstrate proper lifting/ squatting form without cuing from therapist to improve ease with household/work activities.     Eval Status:hyperflexes a trunk  4 patient will reports 75% improvement in severity and frequency of right leg radicular symptoms to improve ease with work tasks.     Eval status: tingling when sitting at work and driving after 2-3 min      PLAN      [x]  Continue plan of care    []  Other:_      Mick Soriano, PT 12/30/2019  1:56 PM

## 2019-12-30 NOTE — PROGRESS NOTES
[facility] - [facility]  [address], [address] [phone] fax    Plan of Care/ Statement of Necessity for Physical Therapy Services           Patient name: Cherylene Dandy Start of Care: 2019   Referral source: Stan Sweeney MD : 1985    Medical Diagnosis: Low back pain [M54.5]  Sciatica, right side [M54.31]  Payor: BLUE CROSS MEDICAID / Plan: [id number] [facility] / Product Type: Managed Care Medicaid /  Onset Date:Aug 2019    Treatment Diagnosis: low back pain, right leg pain   Prior Hospitalization: see medical history Provider#: [id number]   Medications: Verified on Patient summary List    Comorbidities: HTN   Prior Level of Function: works as  at [employer]. Functionally independent, mother of 2. Lives in 2 story home. The Plan of Care and following information is based on the information from the initial evaluation. Assessment/ key information: Patient is a 36 y. o.female presenting with Low back pain [M54.5]  Sciatica, right side [M54.31]. Ms. Nimco Ricketts presents to initial PT evaluation with c/o worsening right LE pain and numbness that began gradually 4-5 months ago. She reports tingling and numbness into the right LE with sitting and bending, as well as (+) slump testing on the right. Mild hip and core weakness present. Lumbar extension bias present for reduction of symptoms. Symptoms are consistent with lumbar radiculopathy. . Patient will benefit from skilled PT services to address deficits and facilitate return to premorbid activity level and promote improved quality of life.        Evaluation Complexity History MEDIUM  Complexity : 1-2 comorbidities / personal factors will impact the outcome/ POC ; Examination LOW Complexity : 1-2 Standardized tests and measures addressing body structure, function, activity limitation and / or participation in recreation  ;Presentation MEDIUM Complexity : Evolving with changing characteristics  ; Clinical Decision Making LOW Complexity : FOTO score of   Overall Complexity Rating: LOW   Problem List: pain affecting function, decrease ROM, decrease strength, edema affecting function, impaired gait/ balance, decrease ADL/ functional abilitiies, decrease activity tolerance, decrease flexibility/ joint mobility and decrease transfer abilities   Treatment Plan may include any combination of the following: Therapeutic exercise, Therapeutic activities, Neuromuscular re-education, Physical agent/modality, Gait/balance training, Manual therapy, Aquatic therapy, Patient education, Self Care training, Functional mobility training, Home safety training and Stair training  Patient / Family readiness to learn indicated by: asking questions, trying to perform skills and interest  Persons(s) to be included in education: patient (P)  Barriers to Learning/Limitations: None  Patient Goal (s): resolve the issue.   Patient Self Reported Health Status: good  Rehabilitation Potential: good  Short Term Goals: To be accomplished in 1 weeks:  1. Therapist to establish HEP for ROM & Strengthening to improve ease with gait & ADLs. Long Term Goals: To be accomplished in 4 weeks:  1. Patient will be independent with HEP to improve carryover of functional gains with ADLs between visits. Eval Status:n/a  2. Pt will decrease average pain rating on VAS to <4/10 to improve ease with mobility/ADLs. Eval Status:4-10/10  3. . Pt will demonstrate proper lifting/ squatting form without cuing from therapist to improve ease with household/work activities. Eval Status:hyperflexes a trunk  4 patient will reports 75% improvement in severity and frequency of right leg radicular symptoms to improve ease with work tasks. Eval status: tingling when sitting at work and driving after 2-3 min    Frequency / Duration: Patient to be seen 2 times per week for 4 weeks.     Patient/ Caregiver education and instruction: Diagnosis, prognosis, self care, activity modification and exercises   [x]  Plan of care has been reviewed with MAKAYLA Ibanez, PT 12/30/2019 1:53 PM    ________________________________________________________________________    I certify that the above Therapy Services are being furnished while the patient is under my care. I agree with the treatment plan and certify that this therapy is necessary.     Physician's Signature:____________Date:_________TIME:________    ** Signature, Date and Time must be completed for valid certification **    Please sign and return to [facility] - [facility]  [address] [place], [address] [phone] fax

## 2020-01-07 ENCOUNTER — HOSPITAL ENCOUNTER (OUTPATIENT)
Dept: PHYSICAL THERAPY | Age: 35
Discharge: HOME | End: 2020-01-07

## 2020-01-16 ENCOUNTER — APPOINTMENT (OUTPATIENT)
Dept: PHYSICAL THERAPY | Age: 35
End: 2020-01-16

## 2020-01-20 NOTE — PROGRESS NOTES
[facility] - [facility]  [address] [place], [address] [phone] fax    Discharge Summary  Patient name: James Gonzalez Start of Care: 2019   Referral source: Gurmeet Smiht MD : 1985                Medical Diagnosis: Low back pain [M54.5]  Sciatica, right side [M54.31]  Payor: BLUE CROSS MEDICAID / Plan: [id number] [facility] / Product Type: Managed Care Medicaid /  Onset Date:Aug 2019                Treatment Diagnosis: low back pain, right leg pain   Prior Hospitalization: see medical history Provider#: [id number]   Medications: Verified on Patient summary List    Comorbidities: HTN   Prior Level of Function: works as  at [employer]. Functionally independent, mother of 2. Lives in 2 story home. Visits from Start of Care: 1    Missed Visits: 3    Reporting Period : 19 to 19    Short Term Goals: To be accomplished in 1 weeks:  1. Therapist to establish HEP for ROM & Strengthening to improve ease with gait & ADLs. MET     Long Term Goals: To be accomplished in 4 weeks:  1. Patient will be independent with HEP to improve carryover of functional gains with ADLs between visits. Eval Status:n/a   Not met, attended evaluation only   2. Pt will decrease average pain rating on VAS to <4/10 to improve ease with mobility/ADLs. Eval Status:4-10/10   Not met, attended evaluation only   3. . Pt will demonstrate proper lifting/ squatting form without cuing from therapist to improve ease with household/work activities. Eval Status:hyperflexes a trunk   Not met, attended evaluation only   4 patient will reports 75% improvement in severity and frequency of right leg radicular symptoms to improve ease with work tasks.               Eval status: tingling when sitting at work and driving after 2-3 min   Not met, attended evaluation only     Assessment/Summary of care: Ms. Dariel Villasenor was seen for PT evaluation and was scheduled for 3 follow up visits, however, pt never returned for treatment. Due to inability to further her care due to lack of attendance, pt is DC at this time. Should pt require treatment in the future, we would be happy to continue her care with an update referral from the physician.      RECOMMENDATIONS:  [x]Discontinue therapy: []Patient has reached or is progressing toward set goals      [x]Patient is non-compliant or has abdicated      []Due to lack of appreciable progress towards set 5664  [address] Blanca, PT 1/20/2020 9:00 AM

## 2020-04-30 ENCOUNTER — HOSPITAL ENCOUNTER (EMERGENCY)
Age: 35
Discharge: HOME | End: 2020-04-30
Attending: EMERGENCY MEDICINE
Payer: MEDICAID

## 2020-04-30 VITALS
RESPIRATION RATE: 18 BRPM | DIASTOLIC BLOOD PRESSURE: 93 MMHG | WEIGHT: 187 LBS | TEMPERATURE: 98.5 F | HEIGHT: 61 IN | OXYGEN SATURATION: 96 % | BODY MASS INDEX: 35.3 KG/M2 | HEART RATE: 92 BPM | SYSTOLIC BLOOD PRESSURE: 130 MMHG

## 2020-04-30 DIAGNOSIS — R55 PRE-SYNCOPE: Primary | ICD-10-CM

## 2020-04-30 LAB
ALBUMIN SERPL-MCNC: 4.2 G/DL (ref 3.4–5)
ALBUMIN/GLOB SERPL: 0.9 {RATIO} (ref 0.8–1.7)
ALP SERPL-CCNC: 59 U/L (ref 45–117)
ALT SERPL-CCNC: 19 U/L (ref 13–56)
ANION GAP SERPL CALC-SCNC: 5 MMOL/L (ref 3–18)
AST SERPL-CCNC: 14 U/L (ref 10–38)
BASOPHILS # BLD: 0 K/UL (ref 0–0.1)
BASOPHILS NFR BLD: 0 % (ref 0–2)
BILIRUB SERPL-MCNC: 0.3 MG/DL (ref 0.2–1)
BUN SERPL-MCNC: 8 MG/DL (ref 7–18)
BUN/CREAT SERPL: 12 (ref 12–20)
CALCIUM SERPL-MCNC: 9.6 MG/DL (ref 8.5–10.1)
CHLORIDE SERPL-SCNC: 101 MMOL/L (ref 100–111)
CO2 SERPL-SCNC: 29 MMOL/L (ref 21–32)
CREAT SERPL-MCNC: 0.69 MG/DL (ref 0.6–1.3)
DIFFERENTIAL METHOD BLD: ABNORMAL
EOSINOPHIL # BLD: 0.2 K/UL (ref 0–0.4)
EOSINOPHIL NFR BLD: 1 % (ref 0–5)
ERYTHROCYTE [DISTWIDTH] IN BLOOD BY AUTOMATED COUNT: 13.9 % (ref 11.6–14.5)
GLOBULIN SER CALC-MCNC: 4.7 G/DL (ref 2–4)
GLUCOSE SERPL-MCNC: 86 MG/DL (ref 74–99)
HCT VFR BLD AUTO: 40.5 % (ref 35–45)
HGB BLD-MCNC: 13.9 G/DL (ref 12–16)
LYMPHOCYTES # BLD: 2.3 K/UL (ref 0.9–3.6)
LYMPHOCYTES NFR BLD: 18 % (ref 21–52)
MCH RBC QN AUTO: 30.3 PG (ref 24–34)
MCHC RBC AUTO-ENTMCNC: 34.3 G/DL (ref 31–37)
MCV RBC AUTO: 88.4 FL (ref 74–97)
MONOCYTES # BLD: 0.8 K/UL (ref 0.05–1.2)
MONOCYTES NFR BLD: 6 % (ref 3–10)
NEUTS SEG # BLD: 9.9 K/UL (ref 1.8–8)
NEUTS SEG NFR BLD: 75 % (ref 40–73)
PLATELET # BLD AUTO: 282 K/UL (ref 135–420)
PMV BLD AUTO: 10.4 FL (ref 9.2–11.8)
POTASSIUM SERPL-SCNC: 3.9 MMOL/L (ref 3.5–5.5)
PROT SERPL-MCNC: 8.9 G/DL (ref 6.4–8.2)
RBC # BLD AUTO: 4.58 M/UL (ref 4.2–5.3)
SODIUM SERPL-SCNC: 135 MMOL/L (ref 136–145)
WBC # BLD AUTO: 13.3 K/UL (ref 4.6–13.2)

## 2020-04-30 PROCEDURE — 85025 COMPLETE CBC W/AUTO DIFF WBC: CPT

## 2020-04-30 PROCEDURE — 93005 ELECTROCARDIOGRAM TRACING: CPT

## 2020-04-30 PROCEDURE — 99284 EMERGENCY DEPT VISIT MOD MDM: CPT

## 2020-04-30 PROCEDURE — 80053 COMPREHEN METABOLIC PANEL: CPT

## 2020-04-30 NOTE — ED NOTES
Attending Physician Note for Supervision of   Resident Physician Care of Patient    I have personally seen and examined this patient. I have fully participated in the care of this patient. I have reviewed all pertinent clinical information, including history, physical exam and plan. Physical exam was performed by the [address] under my direct supervision. I, as the supervising physician, was at the bedside to perform a direct face-to-face PMH, ROS, HPI, and physical examination of the patient in addition to the resident physician's evaluation. Diagnostic Study Results     Abnormal lab results from this emergency department encounter:  Labs Reviewed   CBC WITH AUTOMATED DIFF   METABOLIC PANEL, COMPREHENSIVE       Lab values for this patient within approximately the last 12 hours:  Recent Results (from the past 12 hour(s))   EKG, 12 LEAD, INITIAL    Collection Time: 04/30/20  2:33 PM   Result Value Ref Range    Ventricular Rate 78 BPM    Atrial Rate 78 BPM    P-R Interval 172 ms    QRS Duration 86 ms    Q-T Interval 376 ms    QTC Calculation (Bezet) 428 ms    Calculated P Axis 56 degrees    Calculated R Axis -2 degrees    Calculated T Axis 27 degrees    Diagnosis       Normal sinus rhythm  Possible Left atrial enlargement  Left ventricular hypertrophy  Abnormal ECG  When compared with ECG of 09-APR-2015 20:03,  ST no longer elevated in Anterior leads  T wave inversion now evident in Anterior leads         Radiologist and cardiologist interpretations if available at time of this note:  No results found. Medication(s) ordered for patient during this emergency visit encounter:  Medications - No data to display    Medical Decision Making     I am the first provider for this patient. I reviewed the vital signs, available nursing notes, past medical history, past surgical history, family history and social history. Vital Signs:  Reviewed the patient's vital signs.          Physician comments: This is a young female pregnant patient with dizziness. Normal exam.  She has no abdominal findings to indicate any distress from the pregnancy itself. Merced Morley M.D.   [facility] Board Certified Emergency Physician

## 2020-04-30 NOTE — ED PROVIDER NOTES
EMERGENCY DEPARTMENT HISTORY AND PHYSICAL EXAM    1:50 PM      Date: 2020  Patient Name: Raul Mills    History of Presenting Illness     Chief Complaint   Patient presents with    Dizziness         History Provided By: Patient  Location/Duration/Severity/Modifying factors   Patient with history of chronic HTN and who is ~8 weeks pregnant is coming in with one episode of lightheadedness while at the grocery store that lasted <1 hour that was improved with laying down and with a 20 minute episode of associated \"cheek tightness\" that fully recovered. She denies any abdominal cramping or vaginal bleeding. She says is normally followed by [facility] and her last appt was  and she got lab work at that time. ROS otherwise negative as below. She denies any trauma or fall. PCP: Desire Dias NP    Current Outpatient Medications   Medication Sig Dispense Refill    methocarbamol (ROBAXIN) 500 mg tablet Take 1 Tab by mouth four (4) times daily. 20 Tab 0    HYDROcodone-acetaminophen (NORCO) 5-325 mg per tablet Take 1 Tab by mouth every six (6) hours as needed for Pain. Max Daily Amount: 4 Tabs. 16 Tab 0    HYDROCHLOROTHIAZIDE PO Take  by mouth. Past History     Past Medical History:  Past Medical History:   Diagnosis Date    Dental caries     Hypertension     Insect bite     Migraine     Sore throat     Strep pharyngitis     Tooth ache        Past Surgical History:  Past Surgical History:   Procedure Laterality Date    HX  SECTION         Family History:  Family History   Problem Relation Age of Onset    Cancer Other     Diabetes Other     Hypertension Other        Social History:  Social History     Tobacco Use    Smoking status: Former Smoker    Smokeless tobacco: Never Used   Substance Use Topics    Alcohol use: No    Drug use: No       Allergies:  No Known Allergies      Review of Systems       Review of Systems   Constitutional: Negative for chills and fever. HENT: Negative for congestion and sore throat. Respiratory: Negative for cough, chest tightness and shortness of breath. Cardiovascular: Negative for chest pain and palpitations. Gastrointestinal: Negative for abdominal pain, constipation, diarrhea, nausea and vomiting. Genitourinary: Negative for dysuria and urgency. Musculoskeletal: Negative for back pain and myalgias. Neurological: Positive for dizziness. Negative for syncope, weakness, numbness and headaches. Physical Exam     Visit Vitals  BP (!) 130/93 (BP 1 Location: Left arm, BP Patient Position: Standing)   Pulse 92   Temp 98.5 °F (36.9 °C)   Resp 18   Ht 5' 1\" (1.549 m)   Wt 84.8 kg (187 lb)   SpO2 96%   BMI 35.33 kg/m²         Physical Exam  Constitutional:       General: She is not in acute distress. HENT:      Head: Normocephalic and atraumatic. Eyes:      Extraocular Movements: Extraocular movements intact. Conjunctiva/sclera: Conjunctivae normal.   Cardiovascular:      Rate and Rhythm: Normal rate and regular rhythm. Pulmonary:      Effort: Pulmonary effort is normal.      Breath sounds: Normal breath sounds. Abdominal:      Palpations: Abdomen is soft. Tenderness: There is no abdominal tenderness. Skin:     General: Skin is warm and dry. Neurological:      General: No focal deficit present. Mental Status: She is alert and oriented to person, place, and time.       Gait: Gait normal.           Diagnostic Study Results     Labs -  Recent Results (from the past 12 hour(s))   EKG, 12 LEAD, INITIAL    Collection Time: 04/30/20  2:33 PM   Result Value Ref Range    Ventricular Rate 78 BPM    Atrial Rate 78 BPM    P-R Interval 172 ms    QRS Duration 86 ms    Q-T Interval 376 ms    QTC Calculation (Bezet) 428 ms    Calculated P Axis 56 degrees    Calculated R Axis -2 degrees    Calculated T Axis 27 degrees    Diagnosis       Normal sinus rhythm  Possible Left atrial enlargement  Left ventricular hypertrophy  Abnormal ECG  When compared with ECG of 09-APR-2015 20:03,  ST no longer elevated in Anterior leads  T wave inversion now evident in Anterior leads     CBC WITH AUTOMATED DIFF    Collection Time: 04/30/20  2:44 PM   Result Value Ref Range    WBC 13.3 (H) 4.6 - 13.2 K/uL    RBC 4.58 4.20 - 5.30 M/uL    HGB 13.9 12.0 - 16.0 g/dL    HCT 40.5 35.0 - 45.0 %    MCV 88.4 74.0 - 97.0 FL    MCH 30.3 24.0 - 34.0 PG    MCHC 34.3 31.0 - 37.0 g/dL    RDW 13.9 11.6 - 14.5 %    PLATELET 024 428 - 685 K/uL    MPV 10.4 9.2 - 11.8 FL    NEUTROPHILS 75 (H) 40 - 73 %    LYMPHOCYTES 18 (L) 21 - 52 %    MONOCYTES 6 3 - 10 %    EOSINOPHILS 1 0 - 5 %    BASOPHILS 0 0 - 2 %    ABS. NEUTROPHILS 9.9 (H) 1.8 - 8.0 K/UL    ABS. LYMPHOCYTES 2.3 0.9 - 3.6 K/UL    ABS. MONOCYTES 0.8 0.05 - 1.2 K/UL    ABS. EOSINOPHILS 0.2 0.0 - 0.4 K/UL    ABS. BASOPHILS 0.0 0.0 - 0.1 K/UL    DF AUTOMATED     METABOLIC PANEL, COMPREHENSIVE    Collection Time: 04/30/20  2:44 PM   Result Value Ref Range    Sodium 135 (L) 136 - 145 mmol/L    Potassium 3.9 3.5 - 5.5 mmol/L    Chloride 101 100 - 111 mmol/L    CO2 29 21 - 32 mmol/L    Anion gap 5 3.0 - 18 mmol/L    Glucose 86 74 - 99 mg/dL    BUN 8 7.0 - 18 MG/DL    Creatinine 0.69 0.6 - 1.3 MG/DL    BUN/Creatinine ratio 12 12 - 20      GFR est AA >60 >60 ml/min/1.73m2    GFR est non-AA >60 >60 ml/min/1.73m2    Calcium 9.6 8.5 - 10.1 MG/DL    Bilirubin, total 0.3 0.2 - 1.0 MG/DL    ALT (SGPT) 19 13 - 56 U/L    AST (SGOT) 14 10 - 38 U/L    Alk. phosphatase 59 45 - 117 U/L    Protein, total 8.9 (H) 6.4 - 8.2 g/dL    Albumin 4.2 3.4 - 5.0 g/dL    Globulin 4.7 (H) 2.0 - 4.0 g/dL    A-G Ratio 0.9 0.8 - 1.7         Radiologic Studies -   No orders to display         Medical Decision Making   I am the first provider for this patient. I reviewed the vital signs, available nursing notes, past medical history, past surgical history, family history and social history.     Vital Signs-Reviewed the patient's vital signs.      EKG: No arrythmia no stemi    Records Reviewed: Nursing Notes and Old Medical Records (Time of Review: 1:50 PM)    ED Course: Progress Notes, Reevaluation, and Consults:         Provider Notes (Medical Decision Making):   MDM  Number of Diagnoses or Management Options  Pre-syncope:   Diagnosis management comments: Patient with mild presyncope 2/2 pregnancy possibly with some component of dehydration. Her vitals are normal, her labs are not concerning and she has no cramping or bleeding concerning for pregnancy problems and her abdominal exam was benign which is reassuring. She has no cardiac or respiratory history that suggests need for more extensive workup. (current WBC count 13.3 is WNL in pregnancy). Will DC patient with follow up precautions for worsening symptoms. Other ddx that are less likely given above hx and physical include  Arrhythmia less likely with no hx of such, no palpitations and EKG with no arrythmia  ACS less likely with no chest pain, diaphoresis  Acute bleed less likely with no hx indicative of bleed and labs with no anemia  Iron deficiency anemia less likely with normal blood count  Orthostatic hypotension less likely with BPs actually higher standing than supine  PE less likely with normal vitals      Procedures    Critical Care Time:       Diagnosis     Clinical Impression:   1. Pre-syncope        Disposition: DC home    Follow-up Information     Follow up With Specialties Details Why Contact Info    [facility] EMERGENCY DEPT Emergency Medicine  If symptoms worsen [address] [address]  [phone]    Desire Dias NP Nurse Practitioner  As needed [id number]  27  [phone]             Patient's Medications   Start Taking    No medications on file   Continue Taking    HYDROCHLOROTHIAZIDE PO    Take  by mouth. HYDROCODONE-ACETAMINOPHEN (NORCO) 5-325 MG PER TABLET    Take 1 Tab by mouth every six (6) hours as needed for Pain.  Max Daily Amount: 4 Tabs. METHOCARBAMOL (ROBAXIN) 500 MG TABLET    Take 1 Tab by mouth four (4) times daily.    These Medications have changed    No medications on file   Stop Taking    No medications on file     Sharyle Cong, MD, PGY1  [address]  [address] [facility]

## 2020-04-30 NOTE — DISCHARGE INSTRUCTIONS
Patient Education        Lightheadedness or Faintness: Care Instructions  Your Care Instructions  Lightheadedness is a feeling that you are about to faint or \"pass out. \" You do not feel as if you or your surroundings are moving. It is different from vertigo, which is the feeling that you or things around you are spinning or tilting. Lightheadedness usually goes away or gets better when you lie down. If lightheadedness gets worse, it can lead to a fainting spell. It is common to feel lightheaded from time to time. Lightheadedness usually is not caused by a serious problem. It often is caused by a short-lasting drop in blood pressure and blood flow to your head that occurs when you get up too quickly from a seated or lying position. Follow-up care is a key part of your treatment and safety. Be sure to make and go to all appointments, and call your doctor if you are having problems. It's also a good idea to know your test results and keep a list of the medicines you take. How can you care for yourself at home? · Lie down for 1 or 2 minutes when you feel lightheaded. After lying down, sit up slowly and remain sitting for 1 to 2 minutes before slowly standing up. · Avoid movements, positions, or activities that have made you lightheaded in the past.  · Get plenty of rest, especially if you have a cold or flu, which can cause lightheadedness. · Make sure you drink plenty of fluids, especially if you have a fever or have been sweating. · Do not drive or put yourself and others in danger while you feel lightheaded. When should you call for help? Call 911 anytime you think you may need emergency care. For example, call if:    · You have symptoms of a stroke. These may include:  ? Sudden numbness, tingling, weakness, or loss of movement in your face, arm, or leg, especially on only one side of your body. ? Sudden vision changes. ? Sudden trouble speaking.   ? Sudden confusion or trouble understanding simple statements. ? Sudden problems with walking or balance. ? A sudden, severe headache that is different from past headaches.     · You have symptoms of a heart attack. These may include:  ? Chest pain or pressure, or a strange feeling in the chest.  ? Sweating. ? Shortness of breath. ? Nausea or vomiting. ? Pain, pressure, or a strange feeling in the back, neck, jaw, or upper belly or in one or both shoulders or arms. ? Lightheadedness or sudden weakness. ? A fast or irregular heartbeat. After you call  911, the  may tell you to chew 1 adult-strength or 2 to 4 low-dose aspirin. Wait for an ambulance. Do not try to drive yourself.    Watch closely for changes in your health, and be sure to contact your doctor if:    · Your lightheadedness gets worse or does not get better with home care. Where can you learn more? Go to http://camelia-shanna.info/  Enter [id number] in the search box to learn more about \"Lightheadedness or Faintness: Care Instructions. \"  Current as of: June 26, 2019Content Version: 12.4  © 6581-0753 [employer]. Care instructions adapted under license by [employer] (which disclaims liability or warranty for this information). If you have questions about a medical condition or this instruction, always ask your healthcare professional. [place] [address] any warranty or liability for your use of this information.

## 2020-05-01 LAB
ATRIAL RATE: 78 BPM
CALCULATED P AXIS, ECG09: 56 DEGREES
CALCULATED R AXIS, ECG10: -2 DEGREES
CALCULATED T AXIS, ECG11: 27 DEGREES
DIAGNOSIS, 93000: NORMAL
P-R INTERVAL, ECG05: 172 MS
Q-T INTERVAL, ECG07: 376 MS
QRS DURATION, ECG06: 86 MS
QTC CALCULATION (BEZET), ECG08: 428 MS
VENTRICULAR RATE, ECG03: 78 BPM

## 2020-08-03 ENCOUNTER — APPOINTMENT (OUTPATIENT)
Dept: VASCULAR SURGERY | Age: 35
End: 2020-08-03
Attending: NURSE PRACTITIONER
Payer: MEDICAID

## 2020-08-03 ENCOUNTER — HOSPITAL ENCOUNTER (EMERGENCY)
Age: 35
Discharge: HOME | End: 2020-08-03
Attending: EMERGENCY MEDICINE
Payer: MEDICAID

## 2020-08-03 VITALS
OXYGEN SATURATION: 100 % | BODY MASS INDEX: 37.76 KG/M2 | HEIGHT: 61 IN | SYSTOLIC BLOOD PRESSURE: 140 MMHG | RESPIRATION RATE: 20 BRPM | TEMPERATURE: 98.7 F | DIASTOLIC BLOOD PRESSURE: 94 MMHG | HEART RATE: 75 BPM | WEIGHT: 200 LBS

## 2020-08-03 DIAGNOSIS — M79.652 THIGH PAIN, MUSCULOSKELETAL, LEFT: Primary | ICD-10-CM

## 2020-08-03 PROCEDURE — 99282 EMERGENCY DEPT VISIT SF MDM: CPT

## 2020-08-03 PROCEDURE — 93971 EXTREMITY STUDY: CPT

## 2020-08-03 RX ORDER — HYDROCHLOROTHIAZIDE 12.5 MG/1
CAPSULE ORAL
Status: ON HOLD | COMMUNITY
Start: 2020-05-27 | End: 2020-11-12 | Stop reason: SDUPTHER

## 2020-08-03 NOTE — ED TRIAGE NOTES
Patient presents with c/o left thigh pain intermittently x 3 weeks. Patient states that she is 6 months pregnant and had regular f/u appointment on Tuesday of last week. She states that OB/GYN is aware of leg pain complaint. Patient voices concerns for possible DVT.

## 2020-08-03 NOTE — ED PROVIDER NOTES
EMERGENCY DEPARTMENT HISTORY AND PHYSICAL EXAM    1:10 PM      Date: 8/3/2020  Patient Name: Merlyn Oliveira    History of Presenting Illness     Chief Complaint   Patient presents with    Leg Pain         History Provided By: Patient    Additional History (Context): Merlyn Oliveira is a 36 y.o. female with past medical history significant for hypertension and migraines who presents with left upper leg pain intermittently for the last 2 weeks. She states she is 5 months pregnant and is concerned about a DVT. She denies any injury, trauma, or fall. She denies any swelling or redness. She has no prior history of DVT. She also denies any chest pain or shortness of breath. She denies any pregnancy related complications and thus far has had normal prenatal care. She denies any abdominal or pelvic pain/cramping, vaginal bleeding/discharge, and no back pain. She has good fetal movement. KAMILAH is 2019. PCP: Luci Jauregui NP    Current Outpatient Medications   Medication Sig Dispense Refill    prenatal vit-iron fumarate-fa 27 mg iron- 0.8 mg tab tablet Take 1 Tab by mouth daily.       hydroCHLOROthiazide (MICROZIDE) 12.5 mg capsule TAKE 1 CAPSULE BY MOUTH ONCE DAILY FOR BLOOD PRESSURE         Past History     Past Medical History:  Past Medical History:   Diagnosis Date    Dental caries     Hypertension     Insect bite     Migraine     Sore throat     Strep pharyngitis     Tooth ache        Past Surgical History:  Past Surgical History:   Procedure Laterality Date    HX  SECTION         Family History:  Family History   Problem Relation Age of Onset    Cancer Other     Diabetes Other     Hypertension Other        Social History:  Social History     Tobacco Use    Smoking status: Former Smoker    Smokeless tobacco: Never Used   Substance Use Topics    Alcohol use: No    Drug use: No       Allergies:  No Known Allergies      Review of Systems       Review of Systems Constitutional: Negative. Negative for chills and fever. HENT: Negative. Negative for congestion, ear pain and rhinorrhea. Eyes: Negative. Negative for pain and redness. Respiratory: Negative. Negative for cough and shortness of breath. Cardiovascular: Negative. Negative for chest pain, palpitations and leg swelling. Gastrointestinal: Negative. Negative for abdominal pain, constipation, diarrhea, nausea and vomiting. Genitourinary: Negative. Negative for dysuria, frequency, hematuria and urgency. Musculoskeletal: Negative. Negative for back pain, gait problem, joint swelling and neck pain. Positive for left upper leg pain   Skin: Negative. Negative for rash and wound. Neurological: Negative. Negative for dizziness, seizures, speech difficulty, weakness, light-headedness and headaches. Hematological: Negative for adenopathy. Does not bruise/bleed easily. All other systems reviewed and are negative. Physical Exam     Visit Vitals  BP (!) 140/94 (BP 1 Location: Left arm, BP Patient Position: At rest)   Pulse 75   Temp 98.7 °F (37.1 °C)   Resp 20   Ht 5' 1\" (1.549 m)   Wt 90.7 kg (200 lb)   SpO2 100%   BMI 37.79 kg/m²         Physical Exam  Vitals signs and nursing note reviewed. Constitutional:       General: She is not in acute distress. Appearance: Normal appearance. She is not ill-appearing, toxic-appearing or diaphoretic. HENT:      Head: Normocephalic and atraumatic. Nose: Nose normal.      Mouth/Throat:      Mouth: Mucous membranes are moist.      Pharynx: Oropharynx is clear. Eyes:      General: Lids are normal. Vision grossly intact. Conjunctiva/sclera: Conjunctivae normal.      Pupils: Pupils are equal, round, and reactive to light. Neck:      Musculoskeletal: Full passive range of motion without pain and normal range of motion. Cardiovascular:      Rate and Rhythm: Normal rate and regular rhythm. Pulses: Normal pulses.       Heart sounds: Normal heart sounds. No murmur. No friction rub. No gallop. Pulmonary:      Effort: Pulmonary effort is normal. No respiratory distress. Breath sounds: Normal breath sounds. No wheezing or rales. Abdominal:      General: Bowel sounds are normal.      Palpations: Abdomen is soft. Tenderness: There is no abdominal tenderness. Comments: Gravid with fundal height at the diaphragm   Musculoskeletal: Normal range of motion. Left knee: She exhibits normal range of motion and no swelling. Left upper leg: She exhibits tenderness (Mild tender to palpation diffusely to the inferior anterior and lateral thigh. No induration or masses palpable. No erythema or swelling.). She exhibits no bony tenderness, no swelling, no edema, no deformity and no laceration. Skin:     General: Skin is warm and dry. Capillary Refill: Capillary refill takes less than 2 seconds. Neurological:      General: No focal deficit present. Mental Status: She is alert and oriented to person, place, and time. Psychiatric:         Mood and Affect: Mood normal.         Behavior: Behavior normal. Behavior is cooperative. Diagnostic Study Results     Labs -  No results found for this or any previous visit (from the past 12 hour(s)). Radiologic Studies -   No orders to display     Left lower extremity venous Doppler preliminary result: No evidence of deep venous thrombosis in the left lower extremity    Medical Decision Making   I am the first provider for this patient. I reviewed available nursing notes, past medical history, past surgical history, family history and social history. Vital Signs-Reviewed the patient's vital signs. Records Reviewed: Nursing Notes and Old Medical Records (Time of Review: 1:10 PM)    Pulse Oximetry Analysis - 100% on room air      ED Course: Progress Notes, Reevaluation, and Consults:  1:10 PM  Initial assessment performed.  The patients presenting problems have been discussed, and they/their family are in agreement with the care plan formulated and outlined with them. I have encouraged them to ask questions as they arise throughout their visit. Provider Notes (Medical Decision Making):     Patient is a well-appearing 23-year-old female who presents to ER with complaints of left thigh pain and concern for DVT. She is currently pregnant with no pregnancy related complications and is receiving normal prenatal care. She is initially slightly tachycardic on exam, however on recheck she had a normal heart rate 75 bpm with no further intervention. Due to risk for DVT pregnancy will obtain a venous Doppler to evaluate patient's complaints and treat symptomatically. Will disposition after reassessment assuming no clinical change or worsening and appropriate response to symptomatic treatment. Preliminary result of Doppler shows no evidence of DVT. Patient was reassured. She was recommended to try the supportive treatment at home including Tylenol, ice, and hot compresses as well as stretching. She is to follow-up with her PCP and OB/GYN and return to the ER with any further concerns. Diagnosis     Clinical Impression:   1. Thigh pain, musculoskeletal, left        Disposition: Discharged home in stable condition    DISCHARGE NOTE:     Patient has been reexamined. Patient has no new complaints, changes, or physical findings. Care plan outlined and precautions discussed. Results of venous Doppler ultrasound were reviewed with the patient. All of patient's questions and concerns were addressed. Patient was instructed and agrees to follow up with PCP and OB/GYN, as well as to return to the ED upon further deterioration. Patient is ready to go home.     Follow-up Information     Follow up With Specialties Details Why Contact Jhonathan Escalante NP Nurse Practitioner Schedule an appointment as soon as possible for a visit Follow-up from the Emergency Department [address] [facility] [id number]  [address] [facility] EMERGENCY DEPT Emergency Medicine  As needed, If symptoms worsen [address] [facility]  [phone]           Discharge Medication List as of 8/3/2020  2:07 PM      CONTINUE these medications which have NOT CHANGED    Details   prenatal vit-iron fumarate-fa 27 mg iron- 0.8 mg tab tablet Take 1 Tab by mouth daily. , Historical Med      hydroCHLOROthiazide (MICROZIDE) 12.5 mg capsule TAKE 1 CAPSULE BY MOUTH ONCE DAILY FOR BLOOD PRESSURE, Historical Med               Dictation disclaimer:  Please note that this dictation was completed with eFans, the [employer] voice recognition software. Quite often unanticipated grammatical, syntax, homophones, and other interpretive errors are inadvertently transcribed by the computer software. Please disregard these errors. Please excuse any errors that have escaped final proofreading.

## 2020-08-03 NOTE — ED NOTES
Vascular tech called and advised currently doing a scan at [facility] and will be at [facility] as soon as possible once that is completed in order to scan this pt. Updated PA. Pt resting, calm/quiet/awake, resting, no acute distress, will continue to monitor.

## 2020-08-15 ENCOUNTER — HOSPITAL ENCOUNTER (EMERGENCY)
Age: 35
Discharge: HOME | End: 2020-08-15
Attending: EMERGENCY MEDICINE
Payer: MEDICAID

## 2020-08-15 VITALS
RESPIRATION RATE: 20 BRPM | OXYGEN SATURATION: 99 % | DIASTOLIC BLOOD PRESSURE: 87 MMHG | SYSTOLIC BLOOD PRESSURE: 128 MMHG | HEART RATE: 97 BPM | BODY MASS INDEX: 37.76 KG/M2 | HEIGHT: 61 IN | WEIGHT: 200 LBS

## 2020-08-15 DIAGNOSIS — F41.0 PANIC ATTACK: Primary | ICD-10-CM

## 2020-08-15 LAB
APPEARANCE UR: CLEAR
BILIRUB UR QL: NEGATIVE
COLOR UR: YELLOW
GLUCOSE UR STRIP.AUTO-MCNC: NEGATIVE MG/DL
HGB UR QL STRIP: NEGATIVE
KETONES UR QL STRIP.AUTO: NEGATIVE MG/DL
LEUKOCYTE ESTERASE UR QL STRIP.AUTO: NEGATIVE
NITRITE UR QL STRIP.AUTO: NEGATIVE
PH UR STRIP: 7 [PH] (ref 5–8)
PROT UR STRIP-MCNC: NEGATIVE MG/DL
SP GR UR REFRACTOMETRY: <1.005 (ref 1–1.03)
UROBILINOGEN UR QL STRIP.AUTO: 0.2 EU/DL (ref 0.2–1)

## 2020-08-15 PROCEDURE — 81003 URINALYSIS AUTO W/O SCOPE: CPT

## 2020-08-15 PROCEDURE — 99284 EMERGENCY DEPT VISIT MOD MDM: CPT

## 2020-08-15 PROCEDURE — 93005 ELECTROCARDIOGRAM TRACING: CPT

## 2020-08-16 LAB
ATRIAL RATE: 99 BPM
CALCULATED P AXIS, ECG09: 51 DEGREES
CALCULATED R AXIS, ECG10: 5 DEGREES
CALCULATED T AXIS, ECG11: 26 DEGREES
DIAGNOSIS, 93000: NORMAL
P-R INTERVAL, ECG05: 154 MS
Q-T INTERVAL, ECG07: 338 MS
QRS DURATION, ECG06: 76 MS
QTC CALCULATION (BEZET), ECG08: 433 MS
VENTRICULAR RATE, ECG03: 99 BPM

## 2020-08-16 NOTE — DISCHARGE INSTRUCTIONS
Patient Education        Panic Attacks: Care Instructions  Your Care Instructions     During a panic attack, you may have a feeling of intense fear or terror, trouble breathing, chest pain or tightness, heartbeat changes, dizziness, sweating, and shaking. A panic attack starts suddenly and usually lasts from 5 to 20 minutes but may last even longer. You have the most anxiety about 10 minutes after the attack starts. An attack can begin with a stressful event, or it can happen without a cause. Although panic attacks can cause scary symptoms, you can learn to manage them with self-care, counseling, and medicine. Follow-up care is a key part of your treatment and safety. Be sure to make and go to all appointments, and call your doctor if you are having problems. It's also a good idea to know your test results and keep a list of the medicines you take. How can you care for yourself at home? · Take your medicine exactly as directed. Call your doctor if you think you are having a problem with your medicine. · Go to your counseling sessions and follow-up appointments. · Recognize and accept your anxiety. Then, when you are in a situation that makes you anxious, say to yourself, \"This is not an emergency. I feel uncomfortable, but I am not in danger. I can keep going even if I feel anxious. \"  · Be kind to your body:  ? Relieve tension with exercise or a massage. ? Get enough rest.  ? Avoid alcohol, caffeine, nicotine, and illegal drugs. They can increase your anxiety level, cause sleep problems, or trigger a panic attack. ? Learn and do relaxation techniques. See below for more about these techniques. · Engage your mind. Get out and do something you enjoy. Go to a funny movie, or take a walk or hike. Plan your day. Having too much or too little to do can make you anxious. · Keep a record of your symptoms.  Discuss your fears with a good friend or family member, or join a support group for people with similar problems. Talking to others sometimes relieves stress. · Get involved in social groups, or volunteer to help others. Being alone sometimes makes things seem worse than they are. · Get at least 30 minutes of exercise on most days of the week to relieve stress. Walking is a good choice. You also may want to do other activities, such as running, swimming, cycling, or playing tennis or team sports. Relaxation techniques  Do relaxation exercises for 10 to 20 minutes a day. You can play soothing, relaxing music while you do them, if you wish. · Tell others in your house that you are going to do your relaxation exercises. Ask them not to disturb you. · Find a comfortable place, away from all distractions and noise. · Lie down on your back, or sit with your back straight. · Focus on your breathing. Make it slow and steady. · Breathe in through your nose. Breathe out through either your nose or mouth. · Breathe deeply, filling up the area between your navel and your rib cage. Breathe so that your belly goes up and down. · Do not hold your breath. · Breathe like this for 5 to 10 minutes. Notice the feeling of calmness throughout your whole body. As you continue to breathe slowly and deeply, relax by doing the following for another 5 to 10 minutes:  · Tighten and relax each muscle group in your body. You can begin at your toes and work your way up to your head. · Imagine your muscle groups relaxing and becoming heavy. · Empty your mind of all thoughts. · Let yourself relax more and more deeply. · Become aware of the state of calmness that surrounds you. · When your relaxation time is over, you can bring yourself back to alertness by moving your fingers and toes and then your hands and feet and then stretching and moving your entire body. Sometimes people fall asleep during relaxation, but they usually wake up shortly afterward.   · Always give yourself time to return to full alertness before you drive a car or do anything that might cause an accident if you are not fully alert. Never play a relaxation tape while driving a car. When should you call for help? [id number] anytime you think you may need emergency care. For example, call if:  · You feel you cannot stop from hurting yourself or someone else. Watch closely for changes in your health, and be sure to contact your doctor if:  · Your panic attacks get worse. · You have new or different anxiety. · You are not getting better as expected. Where can you learn more? Go to http://www.gray.com/  Enter H601 in the search box to learn more about \"Panic Attacks: Care Instructions. \"  Current as of: January 31, 2020               Content Version: 12.5  © 4879-8769 [employer]wise, [employer]. Care instructions adapted under license by [employer] (which disclaims liability or warranty for this information). If you have questions about a medical condition or this instruction, always ask your healthcare professional. [place] [address] any warranty or liability for your use of this information.

## 2020-08-16 NOTE — ED TRIAGE NOTES
Pt reports feeling of racing heart rate while lying down that began approx. 1 hour ago. Pt denies any cp or sob.

## 2020-08-16 NOTE — ED PROVIDER NOTES
EMERGENCY DEPARTMENT HISTORY AND PHYSICAL EXAM      Date: 8/15/2020  Patient Name: Renato Toribio    History of Presenting Illness     Chief Complaint   Patient presents with    Rapid Heart Rate       History (Context): Renato Toribio is a 36 y.o. Delma Eduardo with generalized anxiety disorder, who presents with palpitations. These lasted about 10 minutes and are now resolved. Palpitations are associated with worrying about coronavirus. The patient does not have an electronic cardiac rate monitoring device. Patient pregnant with hypertension. On review of systems, the patient denies fever, chills, trauma, back pain, abdominal pain, nausea, vomiting, diaphoresis, loss of consciousness. PCP: Angelo Rosario NP    Current Outpatient Medications   Medication Sig Dispense Refill    prenatal vit-iron fumarate-fa 27 mg iron- 0.8 mg tab tablet Take 1 Tab by mouth daily.  hydroCHLOROthiazide (MICROZIDE) 12.5 mg capsule TAKE 1 CAPSULE BY MOUTH ONCE DAILY FOR BLOOD PRESSURE         Past History     Past Medical History:  Past Medical History:   Diagnosis Date    Dental caries     Hypertension     Insect bite     Migraine     Sore throat     Strep pharyngitis     Tooth ache        Past Surgical History:  Past Surgical History:   Procedure Laterality Date    HX  SECTION         Family History:  Family History   Problem Relation Age of Onset    Cancer Other     Diabetes Other     Hypertension Other        Social History:  Social History     Tobacco Use    Smoking status: Former Smoker    Smokeless tobacco: Never Used   Substance Use Topics    Alcohol use: No    Drug use: No       Allergies:  No Known Allergies    PMH, PSH, family history, social history, allergies reviewed with the patient with significant items noted above. Review of Systems   As per HPI, otherwise reviewed and negative.      Physical Exam     Vitals:    08/15/20 2033 08/15/20 2035 08/15/20 2045 08/15/20 2059   BP: (!) 131/95  128/87    Pulse:       Resp:       SpO2:  100% 99% 99%   Weight:       Height:           Gen: Well-appearing, in no acute distress   HEENT: Normocephalic, sclera anicteric  Cardiovascular: Normal rate, regular rhythm, no murmurs, rubs, gallops. Pulses intact and equal distally. Pulmonary: No respiratory distress. No stridor. Clear lungs. ABD: Soft, nontender, nondistended. Neuro: Alert. Normal speech. Normal mentation. Psych: Normal thought content and thought processes. However, the patient is somewhat anxious now. : No CVA tenderness  EXT: No swelling. Moves all extremities well. No cyanosis or clubbing. Skin: Warm and well-perfused. Other:        Diagnostic Study Results     Labs -     Recent Results (from the past 12 hour(s))   URINALYSIS W/ RFLX MICROSCOPIC    Collection Time: 08/15/20  8:44 PM   Result Value Ref Range    Color YELLOW      Appearance CLEAR      Specific gravity <1.005 (L) 1.005 - 1.030    pH (UA) 7.0 5.0 - 8.0      Protein Negative NEG mg/dL    Glucose Negative NEG mg/dL    Ketone Negative NEG mg/dL    Bilirubin Negative NEG      Blood Negative NEG      Urobilinogen 0.2 0.2 - 1.0 EU/dL    Nitrites Negative NEG      Leukocyte Esterase Negative NEG         Radiologic Studies -   No orders to display     CT Results  (Last 48 hours)    None        CXR Results  (Last 48 hours)    None            Medical Decision Making   I am the first provider for this patient. I reviewed the vital signs, available nursing notes, past medical history, past surgical history, family history and social history. Vital Signs-Reviewed the patient's vital signs. EKG: Interpreted by myself. .  No evidence of long/short QT, short ND interval, WPW, ARVD, HOCM, Brugada syndrome, frequent PVCs. Records Reviewed: Personally, on initial evaluation    MDM:   Patient presents with palpitations in the setting of anxiety. Exam significant for patient pregnant.    DDX considered: Arrhythmia, anxiety, sick sinus syndrome, orthostatic hypotension, preeclampsia  DDX thought to be less likely but also considered due to high risk condition: ACS, PE    Patient condition on initial evaluation: Stable    Plan:   Pain Control  Close Observation  Cardiac monitoring  As per orders noted below:  Orders Placed This Encounter    URINALYSIS W/ RFLX MICROSCOPIC    EKG, 12 LEAD, INITIAL        ED Course:     Hypertension, the patient has chronic essential hypertension, will evaluate urine for signs of proteinuria. UA was negative. EKG within normal limits. No evidence of preeclampsia or cardiac etiology of palpitations. Patient be discharged home. Patient condition at time of disposition: Stable  DISCHARGE NOTE:   Pt has been reexamined. Patient has no new complaints, changes, or physical findings. Care plan outlined and precautions discussed. Results were reviewed with the patient. All medications were reviewed with the patient; will d/c home with no changes to meds. All of pt's questions and concerns were addressed. Alarm symptoms and return precautions associated with chief complaint and evaluation were reviewed with the patient in detail. The patient demonstrated adequate understanding. Patient was instructed and agrees to follow up with PCP, as well as to return to the ED upon further deterioration. Patient is ready to go home. The patient is happy with this plan    Follow-up Information     Follow up With Specialties Details Why Contact Info    [facility] EMERGENCY DEPT Emergency Medicine  As needed, If symptoms worsen [address] Denia Ladd NP Nurse Practitioner  As needed, If symptoms worsen [id number]  [address]  [phone]            Discharge Medication List as of 8/15/2020  8:53 PM                Diagnosis     Clinical Impression:   1.  Panic attack        Signed,  Lary Israel MD  Emergency Physician  [facility]    As a voice dictation software was utilized to dictate this note, minor word transpositions can occur. I apologize for confusing wording and typographic errors. Please feel free to contact me for clarification.

## 2020-11-08 ENCOUNTER — HOSPITAL ENCOUNTER (EMERGENCY)
Age: 35
Discharge: LEFT BEFORE DISCHARGE | End: 2020-11-09
Attending: EMERGENCY MEDICINE
Payer: MEDICAID

## 2020-11-08 PROCEDURE — 75810000275 HC EMERGENCY DEPT VISIT NO LEVEL OF CARE

## 2020-11-09 PROBLEM — O11.9 CHRONIC HYPERTENSION WITH SUPERIMPOSED PRE-ECLAMPSIA: Status: ACTIVE | Noted: 2020-11-09

## 2020-11-09 NOTE — ED TRIAGE NOTES
Pt presents to ED via triage from home with complaints of \"I just want my blood pressure checked\". Pt denies any symptoms, but sts that she is 36w6d regnant and has been diagnosed with preeclampsia.       Past Medical History:   Diagnosis Date    Dental caries     Hypertension     Insect bite     Migraine     Sore throat     Strep pharyngitis     Tooth ache

## 2020-11-09 NOTE — ED NOTES
I performed a brief history of the patient here in triage and I have determined that pt will need further treatment and evaluation from the main side ER physician. I have placed initial orders based on the history to help in expediting patients care.       BP noted 150/102     YOANA Schwab

## 2020-11-30 ENCOUNTER — APPOINTMENT (OUTPATIENT)
Dept: GENERAL RADIOLOGY | Age: 35
End: 2020-11-30
Attending: EMERGENCY MEDICINE
Payer: MEDICAID

## 2020-11-30 ENCOUNTER — HOSPITAL ENCOUNTER (EMERGENCY)
Age: 35
Discharge: HOME | End: 2020-12-01
Attending: EMERGENCY MEDICINE | Admitting: EMERGENCY MEDICINE
Payer: MEDICAID

## 2020-11-30 DIAGNOSIS — R00.2 PALPITATIONS: Primary | ICD-10-CM

## 2020-11-30 LAB
ALBUMIN SERPL-MCNC: 4 G/DL (ref 3.4–5)
ALBUMIN/GLOB SERPL: 0.9 {RATIO} (ref 0.8–1.7)
ALP SERPL-CCNC: 77 U/L (ref 45–117)
ALT SERPL-CCNC: 44 U/L (ref 13–56)
ANION GAP SERPL CALC-SCNC: 6 MMOL/L (ref 3–18)
AST SERPL-CCNC: 31 U/L (ref 10–38)
BASOPHILS # BLD: 0.1 K/UL (ref 0–0.1)
BASOPHILS NFR BLD: 1 % (ref 0–2)
BILIRUB SERPL-MCNC: 0.3 MG/DL (ref 0.2–1)
BUN SERPL-MCNC: 12 MG/DL (ref 7–18)
BUN/CREAT SERPL: 13 (ref 12–20)
CALCIUM SERPL-MCNC: 9.6 MG/DL (ref 8.5–10.1)
CHLORIDE SERPL-SCNC: 105 MMOL/L (ref 100–111)
CK MB CFR SERPL CALC: 0.5 % (ref 0–4)
CK MB SERPL-MCNC: 1.1 NG/ML (ref 5–25)
CK SERPL-CCNC: 239 U/L (ref 26–192)
CO2 SERPL-SCNC: 29 MMOL/L (ref 21–32)
CREAT SERPL-MCNC: 0.94 MG/DL (ref 0.6–1.3)
D DIMER PPP FEU-MCNC: 0.28 UG/ML(FEU)
DIFFERENTIAL METHOD BLD: ABNORMAL
EOSINOPHIL # BLD: 0.4 K/UL (ref 0–0.4)
EOSINOPHIL NFR BLD: 4 % (ref 0–5)
ERYTHROCYTE [DISTWIDTH] IN BLOOD BY AUTOMATED COUNT: 14.8 % (ref 11.6–14.5)
GLOBULIN SER CALC-MCNC: 4.6 G/DL (ref 2–4)
GLUCOSE SERPL-MCNC: 109 MG/DL (ref 74–99)
HCT VFR BLD AUTO: 41.1 % (ref 35–45)
HGB BLD-MCNC: 13.9 G/DL (ref 12–16)
LYMPHOCYTES # BLD: 3.4 K/UL (ref 0.9–3.6)
LYMPHOCYTES NFR BLD: 36 % (ref 21–52)
MAGNESIUM SERPL-MCNC: 2.2 MG/DL (ref 1.6–2.6)
MCH RBC QN AUTO: 28.5 PG (ref 24–34)
MCHC RBC AUTO-ENTMCNC: 33.8 G/DL (ref 31–37)
MCV RBC AUTO: 84.2 FL (ref 74–97)
MONOCYTES # BLD: 0.6 K/UL (ref 0.05–1.2)
MONOCYTES NFR BLD: 6 % (ref 3–10)
NEUTS SEG # BLD: 5.1 K/UL (ref 1.8–8)
NEUTS SEG NFR BLD: 53 % (ref 40–73)
PLATELET # BLD AUTO: 294 K/UL (ref 135–420)
PMV BLD AUTO: 10.9 FL (ref 9.2–11.8)
POTASSIUM SERPL-SCNC: 3.1 MMOL/L (ref 3.5–5.5)
PROT SERPL-MCNC: 8.6 G/DL (ref 6.4–8.2)
RBC # BLD AUTO: 4.88 M/UL (ref 4.2–5.3)
SODIUM SERPL-SCNC: 140 MMOL/L (ref 136–145)
TROPONIN I SERPL-MCNC: <0.02 NG/ML (ref 0–0.04)
WBC # BLD AUTO: 9.6 K/UL (ref 4.6–13.2)

## 2020-11-30 PROCEDURE — 93005 ELECTROCARDIOGRAM TRACING: CPT

## 2020-11-30 PROCEDURE — 71045 X-RAY EXAM CHEST 1 VIEW: CPT

## 2020-11-30 PROCEDURE — 85379 FIBRIN DEGRADATION QUANT: CPT

## 2020-11-30 PROCEDURE — 83735 ASSAY OF MAGNESIUM: CPT

## 2020-11-30 PROCEDURE — 99284 EMERGENCY DEPT VISIT MOD MDM: CPT

## 2020-11-30 PROCEDURE — 82550 ASSAY OF CK (CPK): CPT

## 2020-11-30 PROCEDURE — 85025 COMPLETE CBC W/AUTO DIFF WBC: CPT

## 2020-11-30 PROCEDURE — 80053 COMPREHEN METABOLIC PANEL: CPT

## 2020-11-30 RX ORDER — OXYCODONE AND ACETAMINOPHEN 5; 325 MG/1; MG/1
TABLET ORAL
COMMUNITY
End: 2021-01-05

## 2020-12-01 VITALS
HEART RATE: 80 BPM | SYSTOLIC BLOOD PRESSURE: 121 MMHG | BODY MASS INDEX: 35.68 KG/M2 | DIASTOLIC BLOOD PRESSURE: 88 MMHG | OXYGEN SATURATION: 98 % | TEMPERATURE: 98.6 F | HEIGHT: 61 IN | RESPIRATION RATE: 15 BRPM | WEIGHT: 189 LBS

## 2020-12-01 PROCEDURE — 74011250637 HC RX REV CODE- 250/637: Performed by: EMERGENCY MEDICINE

## 2020-12-01 RX ORDER — ACETAMINOPHEN 500 MG
1000 TABLET ORAL
Status: COMPLETED | OUTPATIENT
Start: 2020-12-01 | End: 2020-12-01

## 2020-12-01 RX ADMIN — ACETAMINOPHEN 1000 MG: 500 TABLET ORAL at 01:49

## 2020-12-01 NOTE — DISCHARGE INSTRUCTIONS
SPECIFIC PATIENT INSTRUCTIONS FROM THE PHYSICIAN WHO TREATED YOU IN THE ER TODAY:  1. Return if any concerns or worsening of condition(s)  2. FOLLOW UP APPOINTMENT:  Your primary doctor in 1-2 days. Patient Education        Palpitations: Care Instructions  Your Care Instructions     Heart palpitations are the uncomfortable sensation that your heart is beating fast or irregularly. You might feel pounding or fluttering in your chest. It might feel like your heart is skipping a beat. Although palpitations may be caused by a heart problem, they also occur because of stress, fatigue, or use of alcohol, caffeine, or nicotine. Many medicines, including diet pills, antihistamines, decongestants, and some herbal products, can cause heart palpitations. Nearly everyone has palpitations from time to time. Depending on your symptoms, your doctor may need to do more tests to try to find the cause of your palpitations. Follow-up care is a key part of your treatment and safety. Be sure to make and go to all appointments, and call your doctor if you are having problems. It's also a good idea to know your test results and keep a list of the medicines you take. How can you care for yourself at home? · Avoid caffeine, nicotine, and excess alcohol. · Do not take illegal drugs, such as methamphetamines and cocaine. · Do not take weight loss or diet medicines unless you talk with your doctor first.  · Get plenty of sleep. · Do not overeat. · If you have palpitations again, take deep breaths and try to relax. This may slow a racing heart. · If you start to feel lightheaded, lie down to avoid injuries that might result if you pass out and fall down. · Keep a record of your palpitations and bring it to your next doctor's appointment. Write down:  ? The date and time. ? Your pulse. (If your heart is beating fast, it may be hard to count your pulse.)  ? What you were doing when the palpitations started. ?  How long the palpitations lasted. ? Any other symptoms. · If an activity causes palpitations, slow down or stop. Talk to your doctor before you do that activity again. · Take your medicines exactly as prescribed. Call your doctor if you think you are having a problem with your medicine. When should you call for help? Call 911 anytime you think you may need emergency care. For example, call if:    · You passed out (lost consciousness).     · You have symptoms of a heart attack. These may include:  ? Chest pain or pressure, or a strange feeling in the chest.  ? Sweating. ? Shortness of breath. ? Pain, pressure, or a strange feeling in the back, neck, jaw, or upper belly or in one or both shoulders or arms. ? Lightheadedness or sudden weakness. ? A fast or irregular heartbeat. After you call 911, the  may tell you to chew 1 adult-strength or 2 to 4 low-dose aspirin. Wait for an ambulance. Do not try to drive yourself.     · You have symptoms of a stroke. These may include:  ? Sudden numbness, tingling, weakness, or loss of movement in your face, arm, or leg, especially on only one side of your body. ? Sudden vision changes. ? Sudden trouble speaking. ? Sudden confusion or trouble understanding simple statements. ? Sudden problems with walking or balance. ? A sudden, severe headache that is different from past headaches. Call your doctor now or seek immediate medical care if:    · You have heart palpitations and:  ? Are dizzy or lightheaded, or you feel like you may faint. ? Have new or increased shortness of breath. Watch closely for changes in your health, and be sure to contact your doctor if:    · You continue to have heart palpitations. Where can you learn more? Go to http://www.gray.com/  Enter R508 in the search box to learn more about \"Palpitations: Care Instructions. \"  Current as of: December 16, 2019               Content Version: 12.6  © 1722-4498 Healthwise, Incorporated. Care instructions adapted under license by [employer] (which disclaims liability or warranty for this information). If you have questions about a medical condition or this instruction, always ask your healthcare professional. [address] any warranty or liability for your use of this information. [employer] Activation    Thank you for requesting access to [employer]. Please follow the instructions below to securely access and download your online medical record. [employer] allows you to send messages to your doctor, view your test results, renew your prescriptions, schedule appointments, and more. How Do I Sign Up? In your internet browser, go to [email or web address]. Click on the First Time User? Click Here link in the Sign In box. You will see the New Member Sign Up page. Enter your [employer] Access Code exactly as it appears below. You will not need to use this code after you´ve completed the sign-up process. If you do not sign up before the expiration date, you must request a new code. [employer] Access Code: [email or web address]  Expires: 3/28/2019  2:27 PM (This is the date your [employer] access code will )    Enter the last four digits of your Social Security Number (xxxx) and Date of Birth (mm/dd/yyyy) as indicated and click Submit. You will be taken to the next sign-up page. Create a [employer] ID. This will be your [employer] login ID and cannot be changed, so think of one that is secure and easy to remember. Create a [employer] password. You can change your password at any time. Enter your Password Reset Question and Answer. This can be used at a later time if you forget your password. Enter your e-mail address. You will receive e-mail notification when new information is available in [address] Click Sign Up. You can now view and download portions of your medical record.   Click the [place] link to download a portable copy of your medical information. Additional Information    If you have questions, please visit the Frequently Asked Questions section of the [employer] website at [email or web address] Remember, [employer] is NOT to be used for urgent needs. For medical emergencies, dial 911.

## 2020-12-01 NOTE — ED PROVIDER NOTES
[employer]  [facility] EMERGENCY DEPT      9:12 PM    Date: 11/30/2020  Patient Name: Luann Emerson    History of Presenting Illness     Chief Complaint   Patient presents with    Rapid Heart Rate     lactating patient, x3 weeks post partum, s/p ecclampsia, Rz HCTX, Nifediipine       30 y.o. female with noted past medical history who presents to the emergency department with palpitations. The patient is a 71-year-old patient who is 3 weeks postpartum. She states that she 6 months ago had a similar episode of palpitations while pregnant that lasted less than 1/2-hour before spontaneous resolution. She was evaluated at [facility] emergency department that time and discharged home. She had no symptoms since then of palpitations to the present. However tonight about 2 hours prior to arrival, about 19 2 hours, the patient had some palpitations that she describes as a fast heartbeat that continues to the present. She states it started when she was eating and she tried to lay down but not get better. She denies any caffeine ingestion. She has had no long trips she denies any smoking history and has had no leg swelling or birth control. She has no other associated symptoms including no chest pain or shortness of breath. No URI symptoms. The patient denies recent travel outside [place] and denies recent travel to areas large social gatherings. The patient denies any known history of Covid 19 exposure. Patient denies any other associated signs or symptoms. Patient denies any other complaints. Nursing notes regarding the HPI and triage nursing notes were reviewed. Prior medical records were reviewed. Current Outpatient Medications   Medication Sig Dispense Refill    oxyCODONE-acetaminophen (PERCOCET) 5-325 mg per tablet Take  by mouth every four (4) hours as needed for Pain.  hydroCHLOROthiazide (MICROZIDE) 12.5 mg capsule Take 1 Cap by mouth daily.  30 Cap 3    ibuprofen (MOTRIN) 600 mg tablet Take 1 Tab by mouth every six (6) hours as needed for Pain. 40 Tab 1    NIFEdipine ER (PROCARDIA XL) 30 mg ER tablet Take 1 Tab by mouth daily. (Patient taking differently: Take 60 mg by mouth daily.) 30 Tab 0    prenatal vit-iron fumarate-fa 27 mg iron- 0.8 mg tab tablet Take 1 Tab by mouth daily. Past History     Past Medical History:  Past Medical History:   Diagnosis Date    Dental caries     Hypertension     Insect bite     Migraine     Sore throat     Strep pharyngitis     Tooth ache        Past Surgical History:  Past Surgical History:   Procedure Laterality Date    HX  SECTION         Family History:  Family History   Problem Relation Age of Onset    Cancer Other     Diabetes Other     Hypertension Other        Social History:  Social History     Tobacco Use    Smoking status: Former Smoker    Smokeless tobacco: Never Used   Substance Use Topics    Alcohol use: No    Drug use: No       Allergies:  No Known Allergies    Patient's primary care provider (as noted in EPIC):  Kaitlyn Fajardo NP    Review of Systems   Constitutional: Negative for diaphoresis. HENT: Negative for congestion. Eyes: Negative for discharge. Respiratory: Negative for shortness of breath, wheezing and stridor. Cardiovascular: Positive for palpitations. Negative for chest pain and leg swelling. Gastrointestinal: Negative for diarrhea. Endocrine: Negative for heat intolerance. Genitourinary: Negative for flank pain. Musculoskeletal: Negative for back pain. Neurological: Negative for weakness. Psychiatric/Behavioral: Negative for hallucinations. All other systems reviewed and are negative.       Visit Vitals  /89 (BP 1 Location: Left arm, BP Patient Position: At rest;Sitting)   Pulse (!) 116   Temp 98.6 °F (37 °C)   Resp 20   Ht 5' 1\" (1.549 m)   Wt 85.7 kg (189 lb)   SpO2 100%   BMI 35.71 kg/m²       PHYSICAL EXAM:    CONSTITUTIONAL:  Alert, in no apparent distress; well developed;  well nourished. HEAD:  Normocephalic, atraumatic. EYES:  EOMI. Non-icteric sclera. Normal conjunctiva. ENTM:  Nose:  no rhinorrhea. Throat:  no erythema or exudate, mucous membranes moist.  NECK:  No JVD. Supple    RESPIRATORY:  Chest clear, equal breath sounds, good air movement. CARDIOVASCULAR:  Regular rate and rhythm. No murmurs, rubs, or gallops. GI:  Normal bowel sounds, abdomen soft and non-tender. No rebound or guarding. BACK:  Non-tender. UPPER EXT:  Normal inspection. LOWER EXT:  No edema, no calf tenderness. Distal pulses intact. NEURO:  Moves all four extremities, and grossly normal motor exam.  SKIN:  No rashes;  Normal for age. PSYCH:  Alert and normal affect. DIFFERENTIAL DIAGNOSES/ MEDICAL DECISION MAKING:   Shortness of breath etiologies include chronic obstructive pulmonary disease (COPD), acute asthma exacerbation, congestive heart failure, pneumonia, acute bronchitis, pulmonary embolism, upper respiratory infection, cardiac event to include acute coronary syndrome, acute myocardial infarction or a combination of the above (ex URI on top of COPD thus causing respiratory distress).       Diagnostic Study Results     Abnormal lab results from this emergency department encounter:  Labs Reviewed   CBC WITH AUTOMATED DIFF - Abnormal; Notable for the following components:       Result Value    RDW 14.8 (*)     All other components within normal limits   CARDIAC PANEL,(CK, CKMB & TROPONIN) - Abnormal; Notable for the following components:     (*)     All other components within normal limits   METABOLIC PANEL, COMPREHENSIVE - Abnormal; Notable for the following components:    Potassium 3.1 (*)     Glucose 109 (*)     Protein, total 8.6 (*)     Globulin 4.6 (*)     All other components within normal limits   MAGNESIUM   D DIMER       Lab values for this patient within approximately the last 12 hours:  Recent Results (from the past 12 hour(s))   CBC WITH AUTOMATED DIFF    Collection Time: 11/30/20  9:10 PM   Result Value Ref Range    WBC 9.6 4.6 - 13.2 K/uL    RBC 4.88 4.20 - 5.30 M/uL    HGB 13.9 12.0 - 16.0 g/dL    HCT 41.1 35.0 - 45.0 %    MCV 84.2 74.0 - 97.0 FL    MCH 28.5 24.0 - 34.0 PG    MCHC 33.8 31.0 - 37.0 g/dL    RDW 14.8 (H) 11.6 - 14.5 %    PLATELET 007 649 - 620 K/uL    MPV 10.9 9.2 - 11.8 FL    NEUTROPHILS 53 40 - 73 %    LYMPHOCYTES 36 21 - 52 %    MONOCYTES 6 3 - 10 %    EOSINOPHILS 4 0 - 5 %    BASOPHILS 1 0 - 2 %    ABS. NEUTROPHILS 5.1 1.8 - 8.0 K/UL    ABS. LYMPHOCYTES 3.4 0.9 - 3.6 K/UL    ABS. MONOCYTES 0.6 0.05 - 1.2 K/UL    ABS. EOSINOPHILS 0.4 0.0 - 0.4 K/UL    ABS. BASOPHILS 0.1 0.0 - 0.1 K/UL    DF AUTOMATED     CARDIAC PANEL,(CK, CKMB & TROPONIN)    Collection Time: 11/30/20  9:10 PM   Result Value Ref Range    CK - MB 1.1 <3.6 ng/ml    CK-MB Index 0.5 0.0 - 4.0 %     (H) 26 - 192 U/L    Troponin-I, QT <0.02 0.0 - 7.336 NG/ML   METABOLIC PANEL, COMPREHENSIVE    Collection Time: 11/30/20  9:10 PM   Result Value Ref Range    Sodium 140 136 - 145 mmol/L    Potassium 3.1 (L) 3.5 - 5.5 mmol/L    Chloride 105 100 - 111 mmol/L    CO2 29 21 - 32 mmol/L    Anion gap 6 3.0 - 18 mmol/L    Glucose 109 (H) 74 - 99 mg/dL    BUN 12 7.0 - 18 MG/DL    Creatinine 0.94 0.6 - 1.3 MG/DL    BUN/Creatinine ratio 13 12 - 20      GFR est AA >60 >60 ml/min/1.73m2    GFR est non-AA >60 >60 ml/min/1.73m2    Calcium 9.6 8.5 - 10.1 MG/DL    Bilirubin, total 0.3 0.2 - 1.0 MG/DL    ALT (SGPT) 44 13 - 56 U/L    AST (SGOT) 31 10 - 38 U/L    Alk.  phosphatase 77 45 - 117 U/L    Protein, total 8.6 (H) 6.4 - 8.2 g/dL    Albumin 4.0 3.4 - 5.0 g/dL    Globulin 4.6 (H) 2.0 - 4.0 g/dL    A-G Ratio 0.9 0.8 - 1.7     MAGNESIUM    Collection Time: 11/30/20  9:10 PM   Result Value Ref Range    Magnesium 2.2 1.6 - 2.6 mg/dL   D DIMER    Collection Time: 11/30/20  9:10 PM   Result Value Ref Range    D DIMER 0.28 <0.46 ug/ml(FEU)   EKG, 12 LEAD, INITIAL    Collection Time: 11/30/20  9:13 PM   Result Value Ref Range    Ventricular Rate 106 BPM    Atrial Rate 106 BPM    P-R Interval 154 ms    QRS Duration 90 ms    Q-T Interval 356 ms    QTC Calculation (Bezet) 472 ms    Calculated P Axis 57 degrees    Calculated R Axis -2 degrees    Calculated T Axis 44 degrees    Diagnosis       Sinus tachycardia  Moderate voltage criteria for LVH, may be normal variant  Nonspecific T wave abnormality  Abnormal ECG  When compared with ECG of 15-AUG-2020 20:27,  Nonspecific T wave abnormality, worse in Lateral leads         Radiologist and cardiologist interpretations if available at time of this note:  No results found. Emergency physician interpretation of EKG: Mild sinus tachycardia about 105 bpm.    Medication(s) ordered for patient during this emergency visit encounter:  Medications - No data to display    Medical Decision Making     I am the first provider for this patient. I reviewed the vital signs, available nursing notes, past medical history, past surgical history, family history and social history. Vital Signs:  Reviewed the patient's vital signs. ED COURSE:        Patient's PERC screening was cannot be assessed secondary to tachycardia    SPECIFIC PATIENT INSTRUCTIONS FROM THE PHYSICIAN WHO TREATED YOU IN THE ER TODAY:  1. Return if any concerns or worsening of condition(s)  2. FOLLOW UP APPOINTMENT:  Your primary doctor in 1-2 days. Patient is improved, resting quietly and comfortably. The patient will be discharged home. The patient was reassured that these symptoms do not appear to represent a serious or life threatening condition at this time. Warning signs of worsening condition were discussed and understood by the patient. Based on patient's age, coexisting illness, exam, and the results of this ED evaluation, the decision to treat as an outpatient was made.  Based on the information available at time of discharge, acute pathology requiring immediate intervention was deemed relative unlikely. While it is impossible to completely exclude the possibility of underlying serious disease or worsening of condition, I feel the relative likelihood is extremely low. I discussed this uncertainty with the patient, who understood ED evaluation and treatment and felt comfortable with the outpatient treatment plan. All questions regarding care, test results, and follow up were answered. The patient is stable and appropriate to discharge. They understand that they should return to the emergency department for any new or worsening symptoms. I stressed the importance of follow up for repeat assessment and possibly further evaluation/treatment. Dictation disclaimer:  Please note that this dictation was completed with [employer], the [employer] voice recognition software. Quite often unanticipated grammatical, syntax, homophones, and other interpretive errors are inadvertently transcribed by the computer software. Please disregard these errors. Please excuse any errors that have escaped final proofreading. Coding Diagnoses     Clinical Impression:   1. Palpitations        Disposition     Disposition: Discharge. MILA Hernandes Board Certified Emergency Physician    Provider Attestation:  If a scribe was utilized in generation of this patient record, I personally performed the services described in the documentation, reviewed the documentation, as recorded by the scribe in my presence, and it accurately records the patient's history of presenting illness, review of systems, patient physical examination, and procedures performed by me as the attending physician. MILA Hernandes Board Certified Emergency Physician  11/30/2020.  9:12 PM

## 2020-12-01 NOTE — ED TRIAGE NOTES
C/o palpitationx x1-2 hours, 3 weeks postpartum and , lactating (breast and bottle feeding) Rx HCTZ, Nifedipine s/p Eclampsia  Csectioin  [facility] 35 weeks [id number]

## 2020-12-02 LAB
ATRIAL RATE: 106 BPM
CALCULATED P AXIS, ECG09: 57 DEGREES
CALCULATED R AXIS, ECG10: -2 DEGREES
CALCULATED T AXIS, ECG11: 44 DEGREES
DIAGNOSIS, 93000: NORMAL
P-R INTERVAL, ECG05: 154 MS
Q-T INTERVAL, ECG07: 356 MS
QRS DURATION, ECG06: 90 MS
QTC CALCULATION (BEZET), ECG08: 472 MS
VENTRICULAR RATE, ECG03: 106 BPM

## 2020-12-14 ENCOUNTER — HOSPITAL ENCOUNTER (EMERGENCY)
Age: 35
Discharge: HOME | End: 2020-12-14
Attending: EMERGENCY MEDICINE
Payer: MEDICAID

## 2020-12-14 VITALS
DIASTOLIC BLOOD PRESSURE: 106 MMHG | OXYGEN SATURATION: 100 % | RESPIRATION RATE: 16 BRPM | HEIGHT: 61 IN | SYSTOLIC BLOOD PRESSURE: 150 MMHG | TEMPERATURE: 98.1 F | WEIGHT: 189 LBS | BODY MASS INDEX: 35.68 KG/M2 | HEART RATE: 82 BPM

## 2020-12-14 DIAGNOSIS — I10 CHRONIC HYPERTENSION: Primary | ICD-10-CM

## 2020-12-14 PROCEDURE — 99283 EMERGENCY DEPT VISIT LOW MDM: CPT

## 2020-12-14 PROCEDURE — 74011250637 HC RX REV CODE- 250/637: Performed by: NURSE PRACTITIONER

## 2020-12-14 RX ORDER — ACETAMINOPHEN 325 MG/1
650 TABLET ORAL
Status: COMPLETED | OUTPATIENT
Start: 2020-12-14 | End: 2020-12-14

## 2020-12-14 RX ADMIN — ACETAMINOPHEN 650 MG: 325 TABLET ORAL at 22:42

## 2020-12-15 NOTE — ED PROVIDER NOTES
DR. [facility]  Emergency Department Treatment Report        10:32 PM Sujit Ardon is a 30 y.o. female with a history of hypertension who presents to ED with a complaint of hypertension. Patient states that she took her blood pressure medicine this morning and then she contacted her primary care and they told her to take an extra blood pressure medicine and then it did not go down and she came into the emergency department. Patient denies any symptoms although she does state a very mild headache. No visual disturbance no weakness no focal weakness no dizziness is been reported. Patient does state that she has been eating more fast food and [place] food lately. No other complaints, associated symptoms or modifying factors at this time. PCP: Lucila Rolle NP      The history is provided by the patient. No  was used. Hypertension    This is a chronic problem. Associated symptoms include headaches. Pertinent negatives include no chest pain, no palpitations, no malaise/fatigue, no neck pain, no peripheral edema, no dizziness, no shortness of breath, no nausea and no vomiting. There are no associated agents to hypertension. Risk factors include salty diet.         Past Medical History:   Diagnosis Date    Dental caries     Hypertension     Insect bite     Migraine     Sore throat     Strep pharyngitis     Tooth ache        Past Surgical History:   Procedure Laterality Date    HX  SECTION           Family History:   Problem Relation Age of Onset    Cancer Other     Diabetes Other     Hypertension Other        Social History     Socioeconomic History    Marital status: SINGLE     Spouse name: Not on file    Number of children: Not on file    Years of education: Not on file    Highest education level: Not on file   Occupational History    Not on file   Social Needs    Financial resource strain: Not on file    Food insecurity     Worry: Not on file Inability: Not on file    Transportation needs     Medical: Not on file     Non-medical: Not on file   Tobacco Use    Smoking status: Former Smoker    Smokeless tobacco: Never Used   Substance and Sexual Activity    Alcohol use: No    Drug use: No    Sexual activity: Yes     Birth control/protection: None   Lifestyle    Physical activity     Days per week: Not on file     Minutes per session: Not on file    Stress: Not on file   Relationships    Social connections     Talks on phone: Not on file     Gets together: Not on file     Attends Hinduism service: Not on file     Active member of club or organization: Not on file     Attends meetings of clubs or organizations: Not on file     Relationship status: Not on file    Intimate partner violence     Fear of current or ex partner: Not on file     Emotionally abused: Not on file     Physically abused: Not on file     Forced sexual activity: Not on file   Other Topics Concern    Not on file   Social History Narrative    Not on file         ALLERGIES: Patient has no known allergies. Review of Systems   Constitutional: Negative for fever and malaise/fatigue. Respiratory: Negative for chest tightness and shortness of breath. Cardiovascular: Negative for chest pain and palpitations. Gastrointestinal: Negative for nausea and vomiting. Genitourinary: Negative for dysuria. Musculoskeletal: Negative for neck pain. Neurological: Positive for headaches. Negative for dizziness, tremors, seizures, syncope, speech difficulty, weakness and numbness. All other systems reviewed and are negative. Vitals:    12/14/20 2116   BP: (!) 150/106   Pulse: 82   Resp: 16   Temp: 98.1 °F (36.7 °C)   SpO2: 100%   Weight: 85.7 kg (189 lb)   Height: 5' 1\" (1.549 m)            Physical Exam  Vitals signs and nursing note reviewed. Constitutional:       General: She is not in acute distress. Appearance: She is well-developed.  She is not ill-appearing, toxic-appearing or diaphoretic. HENT:      Head: Normocephalic and atraumatic. Eyes:      Extraocular Movements: Extraocular movements intact. Right eye: Normal extraocular motion and no nystagmus. Left eye: No nystagmus. Conjunctiva/sclera: Conjunctivae normal.      Pupils: Pupils are equal, round, and reactive to light. Neck:      Musculoskeletal: Normal range of motion and neck supple. Cardiovascular:      Rate and Rhythm: Normal rate and regular rhythm. Pulmonary:      Effort: Pulmonary effort is normal. No respiratory distress. Breath sounds: Normal breath sounds. No stridor. Abdominal:      General: Bowel sounds are normal.      Palpations: Abdomen is soft. Musculoskeletal: Normal range of motion. Skin:     General: Skin is warm and dry. Capillary Refill: Capillary refill takes less than 2 seconds. Neurological:      General: No focal deficit present. Mental Status: She is alert and oriented to person, place, and time. GCS: GCS eye subscore is 4. GCS verbal subscore is 5. GCS motor subscore is 6. Cranial Nerves: Cranial nerves are intact. Motor: Motor function is intact. Coordination: Coordination is intact. Gait: Gait is intact. Deep Tendon Reflexes: Reflexes are normal and symmetric. Psychiatric:         Behavior: Behavior normal.         Thought Content: Thought content normal.         Judgment: Judgment normal.          MDM  Number of Diagnoses or Management Options  Chronic hypertension:   Diagnosis management comments: Patient with chronic hypertension. No neurological complaints no dizziness no weakness no focal weakness is reported no visual disturbances no blurred vision. Patient states she has been eating an increase and fast foods. I do not suspect any neurological disorder at this time there is no stroke risk at this time patient has been eating extra salty food.   Patient is encouraged a low-salt diet suspect this may be attributing to her increased blood pressure. I do not suspect that patient is in need of a full work-up at this time. No neurological deficits. I suspect that patient is having a high blood pressure because of her indiscretion in her diet. I advised patient a DASH diet low in salt I will provide her information in her discharge paperwork with a low-salt diet. She is recommended to call her primary care doctor in the morning and discuss her diet and possible adding another agent for her blood pressure. No other acute illnesses suspected patient discharged home. Amount and/or Complexity of Data Reviewed  Review and summarize past medical records: yes  Independent visualization of images, tracings, or specimens: yes    Risk of Complications, Morbidity, and/or Mortality  Presenting problems: low  Diagnostic procedures: low  Management options: low    Patient Progress  Patient progress: stable         Procedures            Vitals:  Patient Vitals for the past 12 hrs:   Temp Pulse Resp BP SpO2   12/14/20 2116 98.1 °F (36.7 °C) 82 16 (!) 150/106 100 %       Medications ordered:   Medications   acetaminophen (TYLENOL) tablet 650 mg (650 mg Oral Given 12/14/20 2242)          Disposition:    Diagnosis:   1. Chronic hypertension        Disposition: to Home      Follow-up Information     Follow up With Specialties Details Why Contact Info    Brant Low NP Nurse Practitioner   [facility] [facility] [address]  [phone]             Discharge Medication List as of 12/14/2020 10:44 PM      CONTINUE these medications which have NOT CHANGED    Details   oxyCODONE-acetaminophen (PERCOCET) 5-325 mg per tablet Take  by mouth every four (4) hours as needed for Pain., Historical Med      hydroCHLOROthiazide (MICROZIDE) 12.5 mg capsule Take 1 Cap by mouth daily. , Normal, Disp-30 Cap,R-3      ibuprofen (MOTRIN) 600 mg tablet Take 1 Tab by mouth every six (6) hours as needed for Pain., Normal, Disp-40 Tab,R-1      NIFEdipine ER (PROCARDIA XL) 30 mg ER tablet Take 1 Tab by mouth daily. , Normal, Disp-30 Tab,R-0      prenatal vit-iron fumarate-fa 27 mg iron- 0.8 mg tab tablet Take 1 Tab by mouth daily. , Historical Med             Return to the ER if you are unable to obtain referral as directed. Renetta Raines's  results have been reviewed with her. She has been counseled regarding her diagnosis, treatment, and plan. She verbally conveys understanding and agreement of the signs, symptoms, diagnosis, treatment and prognosis and additionally agrees to follow up as discussed. She also agrees with the care-plan and conveys that all of her questions have been answered. I have also provided discharge instructions for her that include: educational information regarding their diagnosis and treatment, and list of reasons why they would want to return to the ED prior to their follow-up appointment, should her condition change. Lei Vásquez ENP-C,FNP-C      Dragon voice recognition was used to generate this report, which may have resulted in some phonetic based errors in grammar and contents.  Even though attempts were made to correct all the mistakes, some may have been missed, and remained in the body of the document

## 2020-12-15 NOTE — ED TRIAGE NOTES
Pt states her blood pressure is high, states she took her normal medication at home and it did not come down, last dose of BP medication at 5pm, told by doctor to come to ER, pt states she has a slight headache

## 2020-12-23 ENCOUNTER — TRANSCRIBE ORDER (OUTPATIENT)
Dept: SCHEDULING | Age: 35
End: 2020-12-23

## 2020-12-23 DIAGNOSIS — R23.0 CYANOSIS: Primary | ICD-10-CM

## 2020-12-23 DIAGNOSIS — Z12.31 VISIT FOR SCREENING MAMMOGRAM: Primary | ICD-10-CM

## 2021-01-04 ENCOUNTER — APPOINTMENT (OUTPATIENT)
Dept: MRI IMAGING | Age: 36
End: 2021-01-04
Attending: PHYSICIAN ASSISTANT
Payer: MEDICAID

## 2021-01-04 ENCOUNTER — APPOINTMENT (OUTPATIENT)
Dept: CT IMAGING | Age: 36
End: 2021-01-04
Attending: PHYSICIAN ASSISTANT
Payer: MEDICAID

## 2021-01-04 ENCOUNTER — HOSPITAL ENCOUNTER (OUTPATIENT)
Age: 36
Setting detail: OBSERVATION
LOS: 1 days | Discharge: HOME | End: 2021-01-05
Attending: EMERGENCY MEDICINE | Admitting: INTERNAL MEDICINE
Payer: MEDICAID

## 2021-01-04 DIAGNOSIS — R20.0 LEFT FACIAL NUMBNESS: Primary | ICD-10-CM

## 2021-01-04 PROBLEM — R20.2 FACIAL TINGLING SENSATION: Status: ACTIVE | Noted: 2021-01-04

## 2021-01-04 PROBLEM — R20.2 NUMBNESS AND TINGLING IN LEFT HAND: Status: ACTIVE | Noted: 2021-01-04

## 2021-01-04 LAB
ANION GAP SERPL CALC-SCNC: 5 MMOL/L (ref 3–18)
BASOPHILS # BLD: 0 K/UL (ref 0–0.1)
BASOPHILS NFR BLD: 1 % (ref 0–2)
BUN SERPL-MCNC: 10 MG/DL (ref 7–18)
BUN/CREAT SERPL: 13 (ref 12–20)
CALCIUM SERPL-MCNC: 9.5 MG/DL (ref 8.5–10.1)
CHLORIDE SERPL-SCNC: 105 MMOL/L (ref 100–111)
CO2 SERPL-SCNC: 29 MMOL/L (ref 21–32)
COVID-19 RAPID TEST, COVR: NOT DETECTED
CREAT SERPL-MCNC: 0.77 MG/DL (ref 0.6–1.3)
DIFFERENTIAL METHOD BLD: ABNORMAL
EOSINOPHIL # BLD: 0.3 K/UL (ref 0–0.4)
EOSINOPHIL NFR BLD: 3 % (ref 0–5)
ERYTHROCYTE [DISTWIDTH] IN BLOOD BY AUTOMATED COUNT: 15.7 % (ref 11.6–14.5)
GLUCOSE BLD STRIP.AUTO-MCNC: 96 MG/DL (ref 70–110)
GLUCOSE SERPL-MCNC: 93 MG/DL (ref 74–99)
HCT VFR BLD AUTO: 36.4 % (ref 35–45)
HGB BLD-MCNC: 11.8 G/DL (ref 12–16)
INR PPP: 1.1 (ref 0.8–1.2)
LYMPHOCYTES # BLD: 2.1 K/UL (ref 0.9–3.6)
LYMPHOCYTES NFR BLD: 25 % (ref 21–52)
MCH RBC QN AUTO: 27.8 PG (ref 24–34)
MCHC RBC AUTO-ENTMCNC: 32.4 G/DL (ref 31–37)
MCV RBC AUTO: 85.6 FL (ref 74–97)
MONOCYTES # BLD: 0.5 K/UL (ref 0.05–1.2)
MONOCYTES NFR BLD: 6 % (ref 3–10)
NEUTS SEG # BLD: 5.6 K/UL (ref 1.8–8)
NEUTS SEG NFR BLD: 65 % (ref 40–73)
PLATELET # BLD AUTO: 285 K/UL (ref 135–420)
PMV BLD AUTO: 10.9 FL (ref 9.2–11.8)
POTASSIUM SERPL-SCNC: 3.8 MMOL/L (ref 3.5–5.5)
PROTHROMBIN TIME: 13.9 SEC (ref 11.5–15.2)
RBC # BLD AUTO: 4.25 M/UL (ref 4.2–5.3)
SODIUM SERPL-SCNC: 139 MMOL/L (ref 136–145)
SOURCE, COVRS: NORMAL
SPECIMEN TYPE, XMCV1T: NORMAL
TROPONIN I SERPL-MCNC: <0.02 NG/ML (ref 0–0.04)
WBC # BLD AUTO: 8.5 K/UL (ref 4.6–13.2)

## 2021-01-04 PROCEDURE — 85025 COMPLETE CBC W/AUTO DIFF WBC: CPT

## 2021-01-04 PROCEDURE — 96372 THER/PROPH/DIAG INJ SC/IM: CPT

## 2021-01-04 PROCEDURE — 74011250637 HC RX REV CODE- 250/637: Performed by: INTERNAL MEDICINE

## 2021-01-04 PROCEDURE — 85610 PROTHROMBIN TIME: CPT

## 2021-01-04 PROCEDURE — 99218 HC RM OBSERVATION: CPT

## 2021-01-04 PROCEDURE — 70450 CT HEAD/BRAIN W/O DYE: CPT

## 2021-01-04 PROCEDURE — 74011000636 HC RX REV CODE- 636: Performed by: EMERGENCY MEDICINE

## 2021-01-04 PROCEDURE — 99232 SBSQ HOSP IP/OBS MODERATE 35: CPT | Performed by: PSYCHIATRY & NEUROLOGY

## 2021-01-04 PROCEDURE — 70496 CT ANGIOGRAPHY HEAD: CPT

## 2021-01-04 PROCEDURE — 99220 PR INITIAL OBSERVATION CARE/DAY 70 MINUTES: CPT | Performed by: INTERNAL MEDICINE

## 2021-01-04 PROCEDURE — A9577 INJ MULTIHANCE: HCPCS | Performed by: INTERNAL MEDICINE

## 2021-01-04 PROCEDURE — 96374 THER/PROPH/DIAG INJ IV PUSH: CPT

## 2021-01-04 PROCEDURE — 84484 ASSAY OF TROPONIN QUANT: CPT

## 2021-01-04 PROCEDURE — 99284 EMERGENCY DEPT VISIT MOD MDM: CPT

## 2021-01-04 PROCEDURE — 74011250636 HC RX REV CODE- 250/636: Performed by: INTERNAL MEDICINE

## 2021-01-04 PROCEDURE — 74011250636 HC RX REV CODE- 250/636: Performed by: PHYSICIAN ASSISTANT

## 2021-01-04 PROCEDURE — 70553 MRI BRAIN STEM W/O & W/DYE: CPT

## 2021-01-04 PROCEDURE — 87635 SARS-COV-2 COVID-19 AMP PRB: CPT

## 2021-01-04 PROCEDURE — 80048 BASIC METABOLIC PNL TOTAL CA: CPT

## 2021-01-04 PROCEDURE — 82962 GLUCOSE BLOOD TEST: CPT

## 2021-01-04 RX ORDER — HEPARIN SODIUM 5000 [USP'U]/ML
5000 INJECTION, SOLUTION INTRAVENOUS; SUBCUTANEOUS EVERY 8 HOURS
Status: DISCONTINUED | OUTPATIENT
Start: 2021-01-04 | End: 2021-01-05 | Stop reason: HOSPADM

## 2021-01-04 RX ORDER — LORAZEPAM 2 MG/ML
1 INJECTION INTRAMUSCULAR
Status: ACTIVE | OUTPATIENT
Start: 2021-01-04 | End: 2021-01-04

## 2021-01-04 RX ORDER — LABETALOL HCL 20 MG/4 ML
5 SYRINGE (ML) INTRAVENOUS
Status: DISCONTINUED | OUTPATIENT
Start: 2021-01-04 | End: 2021-01-05 | Stop reason: HOSPADM

## 2021-01-04 RX ORDER — ACETAMINOPHEN 325 MG/1
650 TABLET ORAL
Status: DISCONTINUED | OUTPATIENT
Start: 2021-01-04 | End: 2021-01-05 | Stop reason: HOSPADM

## 2021-01-04 RX ORDER — POLYETHYLENE GLYCOL 3350 17 G/17G
17 POWDER, FOR SOLUTION ORAL DAILY PRN
Status: DISCONTINUED | OUTPATIENT
Start: 2021-01-04 | End: 2021-01-05 | Stop reason: HOSPADM

## 2021-01-04 RX ORDER — MAGNESIUM SULFATE HEPTAHYDRATE 500 MG/ML
2 INJECTION, SOLUTION INTRAMUSCULAR; INTRAVENOUS
Status: DISCONTINUED | OUTPATIENT
Start: 2021-01-04 | End: 2021-01-04 | Stop reason: CLARIF

## 2021-01-04 RX ORDER — GUAIFENESIN 100 MG/5ML
81 LIQUID (ML) ORAL DAILY
Status: DISCONTINUED | OUTPATIENT
Start: 2021-01-05 | End: 2021-01-05 | Stop reason: HOSPADM

## 2021-01-04 RX ORDER — FAMOTIDINE 20 MG/1
20 TABLET, FILM COATED ORAL EVERY EVENING
Status: DISCONTINUED | OUTPATIENT
Start: 2021-01-04 | End: 2021-01-05 | Stop reason: HOSPADM

## 2021-01-04 RX ORDER — MAGNESIUM SULFATE HEPTAHYDRATE 40 MG/ML
2 INJECTION, SOLUTION INTRAVENOUS ONCE
Status: COMPLETED | OUTPATIENT
Start: 2021-01-04 | End: 2021-01-04

## 2021-01-04 RX ADMIN — FAMOTIDINE 20 MG: 20 TABLET, FILM COATED ORAL at 17:50

## 2021-01-04 RX ADMIN — GADOBENATE DIMEGLUMINE 15 ML: 529 INJECTION, SOLUTION INTRAVENOUS at 13:00

## 2021-01-04 RX ADMIN — IOPAMIDOL 80 ML: 755 INJECTION, SOLUTION INTRAVENOUS at 11:01

## 2021-01-04 RX ADMIN — SODIUM CHLORIDE 1000 ML: 9 INJECTION, SOLUTION INTRAVENOUS at 14:52

## 2021-01-04 RX ADMIN — ACETAMINOPHEN 650 MG: 325 TABLET ORAL at 16:25

## 2021-01-04 RX ADMIN — MAGNESIUM SULFATE HEPTAHYDRATE 2 G: 40 INJECTION, SOLUTION INTRAVENOUS at 14:59

## 2021-01-04 RX ADMIN — HEPARIN SODIUM 5000 UNITS: 5000 INJECTION INTRAVENOUS; SUBCUTANEOUS at 14:59

## 2021-01-04 NOTE — ED NOTES
Received pt from waiting room and took over care. Pt complaints of facial and arm tingling. Pt alert and oriented X 4.    Code stroke called   Pt currently at CT

## 2021-01-04 NOTE — H&P
History & Physical    Patient: Lashawn Blair MRN: [id number]  CSN: [id number]    YOB: 1985  Age: 30 y.o. Sex: female      DOA: 2021  CC: left facial numbness, left hand numbness    PCP: Hubert Marin NP       HPI:     Lashawn Blair is a 30 y.o. female with medical co-morbidities including HTN, recently pregnancy with preeclampsia, presented from home with recurrent onset of left hand tingling and numbness, left facial tingling and numbness. She has no associate weakness of her extremities. She was recently seen in [facility] ER with the same complaint and was sent home with adjustment of her antihypertensive medications. Since then, she stated that she had better blood pressure control. In the ER, lab workup are benign. Negative covid. CT head was without pathology. CTA head/neck was calcified fourth ventricle mask. Neurology recommended admission for MRI brain          Review of Systems  GENERAL: No fever, No chill, No malaise   HEENT: No change in vision, no ear ache, tinnitus, no sore throat or sinus congestion. NECK: No pain or stiffness. PULMONARY: No shortness of breath, no cough or wheeze. Cardiovascular: no pnd / orthopnea, no Chest Pain  GASTROINTESTINAL: No abd pain, No nausea/vomiting, No diarrhea, No bright red blood per rectum. GENITOURINARY: No urinary frequency, No urgency or pain with urination. MUSCULOSKELETAL: No joint or muscle pain, no back pain, no recent trauma. DERMATOLOGIC: No rash, no itching, no lesions. ENDOCRINE: No polyuria, polydipsia, No recent change in weight. HEMATOLOGICAL: No easy bruising or bleeding.    NEUROLOGIC: No headache, No seizures, No generalized weakness         Past Medical History:   Diagnosis Date    Dental caries     Hypertension     Insect bite     Migraine     Sore throat     Strep pharyngitis     Tooth ache        Past Surgical History:   Procedure Laterality Date    HX  SECTION Family History   Problem Relation Age of Onset    Cancer Other     Diabetes Other     Hypertension Other        Social History     Socioeconomic History    Marital status: SINGLE     Spouse name: Not on file    Number of children: Not on file    Years of education: Not on file    Highest education level: Not on file   Tobacco Use    Smoking status: Former Smoker    Smokeless tobacco: Never Used   Substance and Sexual Activity    Alcohol use: No    Drug use: No    Sexual activity: Yes     Birth control/protection: None       Prior to Admission medications    Medication Sig Start Date End Date Taking? Authorizing Provider   oxyCODONE-acetaminophen (PERCOCET) 5-325 mg per tablet Take  by mouth every four (4) hours as needed for Pain. Contreras, MD Nella   hydroCHLOROthiazide (MICROZIDE) 12.5 mg capsule Take 1 Cap by mouth daily. 11/12/20   Lian Toledo MD   ibuprofen (MOTRIN) 600 mg tablet Take 1 Tab by mouth every six (6) hours as needed for Pain. 11/12/20   Lian Toledo MD   NIFEdipine ER (PROCARDIA XL) 30 mg ER tablet Take 1 Tab by mouth daily. Patient taking differently: Take 60 mg by mouth daily. 11/13/20   Lian Toledo MD   prenatal vit-iron fumarate-fa 27 mg iron- 0.8 mg tab tablet Take 1 Tab by mouth daily. Other, MD Nella       No Known Allergies           Physical Exam:      Visit Vitals  BP (!) 136/91   Pulse 80   Temp 97.2 °F (36.2 °C)   Resp 16   SpO2 100%       Physical Exam:  Tele: sinus  General:  Cooperative, Not in acute distress, speaks in full sentence while in bed  HEENT: PERRL, EOMI, supple neck, no JVD, dry oral mucosa  Cardiovascular: S1S2 regular, no rub/gallop   Pulmonary: Clear air entry bilaterally, no wheezing, no crackle  GI:  Soft, non tender, non distended, +bs, no guarding   Extremities:  No pedal edema, +distal pulses appreciated   Neuro: AOx3, moving all extremities, no gross deficit.       Lab/Data Review:  Labs: Results:       Chemistry Recent Labs 01/04/21  1107   GLU 93      K 3.8      CO2 29   BUN 10   CREA 0.77   CA 9.5   AGAP 5   BUCR 13      CBC w/Diff Recent Labs     01/04/21  1107   WBC 8.5   RBC 4.25   HGB 11.8*   HCT 36.4      GRANS 65   LYMPH 25   EOS 3      Coagulation Recent Labs     01/04/21  1107   PTP 13.9   INR 1.1       Iron/Ferritin    BNP    Cardiac Enzymes    Liver Enzymes    Thyroid Studies      All Micro Results     None          Imaging Reviewed:  CT Results (most recent):  Results from Hospital Encounter encounter on 01/04/21   CTA HEAD NECK W CONT    Narrative EXAM: CTA HEAD AND NECK    CLINICAL INDICATION/HISTORY: Numbness    TECHNIQUE: CTA of the head and neck was performed from the lung apices to the  vertex after administration of 80 mL of Isovue intravenous contrast, during the  arterial phase. Multiplanar reformats and 3-D reconstructions were produced by  the technologist.     All CT exams at this location are performed using dose optimization techniques  as appropriate to a performed exam including at least one of the following:  *  Automated exposure control  *  Adjustment of the mA and/or kV according to patient size (this includes  techniques or standardized protocols for targeted exams where dose is matched to  indication / reason for exam; i.e. extremities or head)  *  Use of iterative reconstructive technique    COMPARISON: CT head from the same day. MRI brain from the same day. FINDINGS:     CTA Head:    There is redemonstration of a calcified fourth ventricular mass. There is mildly  increased vascularity adjacent to the mass at the cerebellar tonsils. No clearly  engorged posterior fossa arteries or veins to suggest arteriovenous shunting. Please see forthcoming MRI for further details. Assessment of distal intracranial arteries is limited by venous contamination. Intracranial segments of internal carotid arteries are unremarkable. Anterior,  middle, posterior cerebral arteries are patent. Vertebrobasilar system is  patent. There is no arterial occlusion or aneurysm. Visualized dural venous  sinuses and deep cerebral veins are patent. CTA Neck:    Visualized aorta is normal in caliber. Great vessel origins are patent. Common  carotid arteries are widely patent. External carotid arteries are patent. Cervical segments of the vertebral arteries are patent. Cervical segments of  ICAs and vertebral arteries are tortuous, which may relate to underlying  hypertension. There is no hemodynamically significant stenosis of cervical  internal carotid arteries by NASCET criteria. Incidental note is made of asymmetric hypoattenuation at the right tongue. This  suggests fatty infiltration related to hypoglossal nerve palsy. Lung apices are  well-aerated. There is large dental cavity and posterior right maxillary molar. Impression IMPRESSION:     CTA head:  1. Patent intracranial arterial circulation. 2. Redemonstration of a calcified fourth ventricular mass. There is mildly  increased vascularity adjacent to the mass at the cerebellar tonsils. No clearly  engorged posterior fossa arteries or veins to suggest arteriovenous shunting. Please see forthcoming MRI for further details. CTA neck:  1. Tortuous neck arteries, which may reflect underlying hypertension. 2. Patent neck arteries. No hemodynamically significant stenosis by NASCET  criteria. 3. Incidental note is made of asymmetric hypoattenuation at the right tongue. This suggests fatty infiltration related to right hypoglossal nerve palsy. 4. Large dental cavity and posterior right maxillary molar. Concordant preliminary read by Dr. Mitesh Sheikh at [phone] on 1/4/2021. The above findings were discussed with Michel LEES, via telephone by Dr. Mitesh Sheikh at [phone] on 1/4/2021.          MRI Results (most recent):  Results from [place] encounter on 01/04/21   MRI BRAIN W WO CONT    Narrative EXAM: MRI BRAIN W WO CONT    CLINICAL INDICATION/HISTORY: \"Rule out stroke\"  -Hypertension, mild headache. COMPARISON: CTA head and neck from earlier the same day. TECHNIQUE: MR imaging was performed through the brain before and after  uneventful administration of 15 mL of MultiHance intravenously. FINDINGS:    Again noted is a coarsely calcified, 1 x 1.8 x 1.7 cm (AP, TV, SI) lobulated  mass centered in the fourth ventricle. The mass is mildly enhancing. There are 2 adjacent thin parafalcine meningiomas (axial postcontrast 22, 24;  coronal 11, 13, respectively). There is possible, soft tissue fullness in the right jugular foramen on limited  detail thick-slice assessment (axial T2 image 6). There is a punctate focus of T2/FLAIR hyperintensity in subcortical white matter  of right frontal lobe (axial FLAIR 15). There is a possible punctate focus of  enhancement at the fundus of the right internal auditory canal (coronal  postcontrast, 17, axial 7). No acute intracranial hemorrhage, acute infarct, or hydrocephalus is present. Incidental note is made of partially empty sella. Impression IMPRESSION:    1. There is a coarsely calcified, lobulated 1.8 cm mass in the fourth ventricle. 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 Subependymoma is less likely in this  demographic, but could appear similar on imaging. Pilocytic astrocytoma,  hemangioblastoma are possible but would be more likely to have a large cystic  component. Metastasis or choroid plexus papilloma remain less likely  considerations. 2. Concomitant presence of at least 2 thin parafalcine meningiomas raises  suspicion for underlying genetic abnormality such as neurofibromatosis type II,  especially if the above represents ependymoma, though not necessarily.     3. Possible subcentimeter lesions at right jugular foramen and internal auditory  canal, incompletely characterized due to thick slab acquisition. The presence of  these lesions would corroborate the possibility of neurofibromatosis type II. Postcontrast MPRAGE, IAC sequences are recommended at follow-up. 4. No hydrocephalus or associated mass effect. No cerebral edema or infarct. 5. Incidental note is made of partially empty sella, nonspecific, but sometimes  seen in the setting of elevated intracranial pressure. The above findings were discussed with Dianna LEES, via telephone by Dr. Gómez Arshad at [phone] on 1/4/2021. Assessment:   Active Problems:  1. 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 Partially empty sella, nonspecific per MRI brain   5. Left facial tingling, numbness  6. Numbness and tingling in left hand   7. Hypertension       Plan:     She is placed under observation and will need neurology consultation follow up. I updated her the MRI brain finding and concerning for tumor and the needs for further workup. Currently, she will need further neurological surgery follow up. If this is truly neurofibromatosis, then she will need MRI c-spine, t-spine, l-spine to evaluation for spine tumor   Will speak with neurology for further assistance in her care.    pepcid     Risk of deterioration:  []Low    [x]Moderate  []High     Prophylaxis:  []Lovenox  []Coumadin  [x]Hep SQ  []SCDs  []H2B/PPI     Disposition:  [x]Home w/ Family   [] PT,OT,RN   []SNF/LTC   []SAH/Rehab     Discussed Code Status:         [x]Full Code      []DNR         ___________________________________________________     Care Plan discussed with:    [x]Patient   []Family    []ED Care Manager  [x]ED Doc   [x]Specialist :  Total Time Coordinating Admission:  70    minutes []Total Critical Care Time:       Emily Kessler MD  1/4/2021, 2:10 PM

## 2021-01-04 NOTE — Clinical Note
Status[de-identified] INPATIENT [101]   Type of Bed: Medical [8]   Inpatient Hospitalization Certified Necessary for the Following Reasons: 3.  Patient receiving treatment that can only be provided in an inpatient setting (further clarification in H&P documentation)   Admitting Diagnosis: Facial tingling sensation [[id number]]   Admitting Physician: Bandar Sanchez [[id number]   Attending Physician: Osmar White   Estimated Length of Stay: 2 Midnights   Discharge Plan[de-identified] Home with Office Follow-up

## 2021-01-04 NOTE — ED PROVIDER NOTES
31yo F, 2 months post partum, with uncomplicated and healthy birth, h/o migraines,  h/o pre eclampsia here with c/o left sided facial tingling and left shoulder/arm tingling that started 12 hours ago. States her sx were present prior tto going to bed last night and present when she woke up. Denies change in speech, gait, decreased motor function, headache, dizziness, neck pain or stiffness. Pt denies any fevers or chills, headache, dizziness or light headedness, ENT issues, CP or discomfort, SOB, cough, diaphoresis, melena/hematochezia, dysuria, hematuria, frequency, focal weakness/numbness/tingling, or rash. Patient has no other complaints at this time. Diagnosed with recent pre-eclampsia but has been taking meds and bp has been wnl.               Past Medical History:   Diagnosis Date    Dental caries     Hypertension     Insect bite     Migraine     Sore throat     Strep pharyngitis     Tooth ache        Past Surgical History:   Procedure Laterality Date    HX  SECTION           Family History:   Problem Relation Age of Onset    Cancer Other     Diabetes Other     Hypertension Other        Social History     Socioeconomic History    Marital status: SINGLE     Spouse name: Not on file    Number of children: Not on file    Years of education: Not on file    Highest education level: Not on file   Occupational History    Not on file   Social Needs    Financial resource strain: Not on file    Food insecurity     Worry: Not on file     Inability: Not on file    Transportation needs     Medical: Not on file     Non-medical: Not on file   Tobacco Use    Smoking status: Former Smoker    Smokeless tobacco: Never Used   Substance and Sexual Activity    Alcohol use: No    Drug use: No    Sexual activity: Yes     Birth control/protection: None   Lifestyle    Physical activity     Days per week: Not on file     Minutes per session: Not on file    Stress: Not on file   Relationships    Social connections     Talks on phone: Not on file     Gets together: Not on file     Attends Alevism service: Not on file     Active member of club or organization: Not on file     Attends meetings of clubs or organizations: Not on file     Relationship status: Not on file    Intimate partner violence     Fear of current or ex partner: Not on file     Emotionally abused: Not on file     Physically abused: Not on file     Forced sexual activity: Not on file   Other Topics Concern    Not on file   Social History Narrative    Not on file         ALLERGIES: Patient has no known allergies. Review of Systems   Constitutional: Negative for chills and fever. HENT: Negative for congestion. Respiratory: Negative for cough and wheezing. Cardiovascular: Negative for chest pain and leg swelling. Gastrointestinal: Negative for abdominal pain, constipation, diarrhea, nausea and vomiting. Genitourinary: Negative. Musculoskeletal: Negative. Skin: Negative. Neurological: Negative for dizziness, seizures, weakness and headaches. +decreased sensation    Hematological: Negative. Psychiatric/Behavioral: Negative. All other systems reviewed and are negative. Vitals:    01/04/21 0935 01/04/21 1115 01/04/21 1619   BP: 119/85 (!) 136/91 128/64   Pulse: 80  78   Resp: 16  16   Temp: 97.2 °F (36.2 °C)  98.4 °F (36.9 °C)   SpO2: 99% 100% 99%            Physical Exam  Vitals signs and nursing note reviewed. Constitutional:       General: She is not in acute distress. Appearance: She is well-developed. She is not diaphoretic. Comments: NAD, well hydrated, non toxic     HENT:      Head: Normocephalic and atraumatic. Nose: Nose normal.      Mouth/Throat:      Pharynx: No oropharyngeal exudate. Eyes:      Conjunctiva/sclera: Conjunctivae normal.      Pupils: Pupils are equal, round, and reactive to light. Neck:      Musculoskeletal: Normal range of motion and neck supple.    Cardiovascular: Rate and Rhythm: Normal rate and regular rhythm. Heart sounds: Normal heart sounds. No murmur. Pulmonary:      Effort: Pulmonary effort is normal. No respiratory distress. Breath sounds: Normal breath sounds. No wheezing or rales. Abdominal:      General: There is no distension. Palpations: Abdomen is soft. Tenderness: There is no abdominal tenderness. There is no guarding. Musculoskeletal: Normal range of motion. Lymphadenopathy:      Cervical: No cervical adenopathy. Skin:     General: Skin is warm. Findings: No rash. Neurological:      General: No focal deficit present. Mental Status: She is alert and oriented to person, place, and time. Cranial Nerves: No cranial nerve deficit. Sensory: Sensory deficit present. Motor: No weakness. Coordination: Coordination normal.      Comments: CN2-12 intact. no nystagmus, neg pronator drift, neg romberg, neg LE drift. Normal gait. No dysdiadochokinesis, past pointing, tremor.         Psychiatric:         Mood and Affect: Mood normal.         Behavior: Behavior normal.          MDM  Number of Diagnoses or Management Options         Procedures      Medications ordered:   Medications   LORazepam (ATIVAN) injection 1 mg (0 mg IntraVENous Held 1/4/21 1111)   labetaloL (NORMODYNE;TRANDATE) 20 mg/4 mL (5 mg/mL) injection 5 mg (has no administration in time range)   heparin (porcine) injection 5,000 Units (5,000 Units SubCUTAneous Given 1/4/21 1459)   famotidine (PEPCID) tablet 20 mg (has no administration in time range)   polyethylene glycol (MIRALAX) packet 17 g (has no administration in time range)   acetaminophen (TYLENOL) tablet 650 mg (650 mg Oral Given 1/4/21 1625)   aspirin chewable tablet 81 mg (has no administration in time range)   iopamidoL (ISOVUE-370) 76 % injection 80 mL (80 mL IntraVENous Given 1/4/21 1101)   sodium chloride 0.9 % bolus infusion 1,000 mL (1,000 mL IntraVENous New Bag 1/4/21 1452) gadobenate dimeglumine (MULTIHANCE) 529 mg/mL (0.1mmol/0.2mL) contrast injection 15 mL (15 mL IntraVENous Given 1/4/21 1300)   magnesium sulfate 2 g/50 ml IVPB (premix or compounded) (2 g IntraVENous New Bag 1/4/21 1459)         Lab findings:  Recent Results (from the past 12 hour(s))   GLUCOSE, POC    Collection Time: 01/04/21 11:05 AM   Result Value Ref Range    Glucose (POC) 96 70 - 110 mg/dL   CBC WITH AUTOMATED DIFF    Collection Time: 01/04/21 11:07 AM   Result Value Ref Range    WBC 8.5 4.6 - 13.2 K/uL    RBC 4.25 4.20 - 5.30 M/uL    HGB 11.8 (L) 12.0 - 16.0 g/dL    HCT 36.4 35.0 - 45.0 %    MCV 85.6 74.0 - 97.0 FL    MCH 27.8 24.0 - 34.0 PG    MCHC 32.4 31.0 - 37.0 g/dL    RDW 15.7 (H) 11.6 - 14.5 %    PLATELET 704 052 - 592 K/uL    MPV 10.9 9.2 - 11.8 FL    NEUTROPHILS 65 40 - 73 %    LYMPHOCYTES 25 21 - 52 %    MONOCYTES 6 3 - 10 %    EOSINOPHILS 3 0 - 5 %    BASOPHILS 1 0 - 2 %    ABS. NEUTROPHILS 5.6 1.8 - 8.0 K/UL    ABS. LYMPHOCYTES 2.1 0.9 - 3.6 K/UL    ABS. MONOCYTES 0.5 0.05 - 1.2 K/UL    ABS. EOSINOPHILS 0.3 0.0 - 0.4 K/UL    ABS.  BASOPHILS 0.0 0.0 - 0.1 K/UL    DF AUTOMATED     METABOLIC PANEL, BASIC    Collection Time: 01/04/21 11:07 AM   Result Value Ref Range    Sodium 139 136 - 145 mmol/L    Potassium 3.8 3.5 - 5.5 mmol/L    Chloride 105 100 - 111 mmol/L    CO2 29 21 - 32 mmol/L    Anion gap 5 3.0 - 18 mmol/L    Glucose 93 74 - 99 mg/dL    BUN 10 7.0 - 18 MG/DL    Creatinine 0.77 0.6 - 1.3 MG/DL    BUN/Creatinine ratio 13 12 - 20      GFR est AA >60 >60 ml/min/1.73m2    GFR est non-AA >60 >60 ml/min/1.73m2    Calcium 9.5 8.5 - 10.1 MG/DL   PROTHROMBIN TIME + INR    Collection Time: 01/04/21 11:07 AM   Result Value Ref Range    Prothrombin time 13.9 11.5 - 15.2 sec    INR 1.1 0.8 - 1.2     TROPONIN I    Collection Time: 01/04/21 11:07 AM   Result Value Ref Range    Troponin-I, QT <0.02 0.0 - 0.045 NG/ML   SARS-COV-2    Collection Time: 01/04/21 12:11 PM   Result Value Ref Range    Specimen source Nasopharyngeal      COVID-19 rapid test Not detected NOTD      Specimen type NP Swab            X-Ray, CT or other radiology findings or impressions:  Mri Brain W Wo Cont    Result Date: 1/4/2021  EXAM: MRI BRAIN W WO CONT CLINICAL INDICATION/HISTORY: Farida Roberson out stroke\" -Hypertension, mild headache. COMPARISON: CTA head and neck from earlier the same day. TECHNIQUE: MR imaging was performed through the brain before and after uneventful administration of 15 mL of MultiHance intravenously. FINDINGS: Again noted is a coarsely calcified, 1 x 1.8 x 1.7 cm (AP, TV, SI) lobulated mass centered in the fourth ventricle. The mass is mildly enhancing. There are 2 adjacent thin parafalcine meningiomas (axial postcontrast 22, 24; coronal 11, 13, respectively). There is possible, soft tissue fullness in the right jugular foramen on limited detail thick-slice assessment (axial T2 image 6). There is a punctate focus of T2/FLAIR hyperintensity in subcortical white matter of right frontal lobe (axial FLAIR 15). There is a possible punctate focus of enhancement at the fundus of the right internal auditory canal (coronal postcontrast, 17, axial 7). No acute intracranial hemorrhage, acute infarct, or hydrocephalus is present. Incidental note is made of partially empty sella. IMPRESSION: 1. There is a coarsely calcified, lobulated 1.8 cm mass in the fourth ventricle. 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 Subependymoma is less likely in this demographic, but could appear similar on imaging. Pilocytic astrocytoma, hemangioblastoma are possible but would be more likely to have a large cystic component. Metastasis or choroid plexus papilloma remain less likely considerations.  2. Concomitant presence of at least 2 thin parafalcine meningiomas raises suspicion for underlying genetic abnormality such as neurofibromatosis type II, especially if the above represents ependymoma, though not necessarily. 3. Possible subcentimeter lesions at right jugular foramen and internal auditory canal, incompletely characterized due to thick slab acquisition. The presence of these lesions would corroborate the possibility of neurofibromatosis type II. Postcontrast MPRAGE, IAC sequences are recommended at follow-up. 4. No hydrocephalus or associated mass effect. No cerebral edema or infarct. 5. Incidental note is made of partially empty sella, nonspecific, but sometimes seen in the setting of elevated intracranial pressure. The above findings were discussed with Christos LEES, via telephone by Dr. Sharona Esposito at [phone] on 1/4/2021. Ct Head Wo Cont    Result Date: 1/4/2021  CT of the head without contrast HISTORY:Code S. . Left cheek tingling and left arm weakness COMPARISON: None. TECHNIQUE: Helical axial scan to the head was performed from the skull base to the vertex without IV contrast administration. All CT scans at this facility performed using dose optimization techniques as appreciated to a performed exam, to include automated exposure control, adjustment of the mA and or KU according to patient size (including appropriate matching for site specific examination), or use of iterative reconstruction technique. FINDINGS: Brain volume appears unremarkable for age. No ventricular dilatation. There is normal gray-white matter differentiation. There is no evidence of acute intracranial hemorrhage, mass effect or midline shift identified. No hyperdense MCA sign. No skull fracture or extra axial fluid collections identified. Visualized sinuses and mastoid air cells appear unremarkable. IMPRESSION: No acute intracranial hemorrhage or mass effect. Note: If clinical concern of acute stroke, CTA or MRI with diffusion weighted images is recommended for further evaluation.  The wet read was provided to the ordering physician in ER,, Dr. Mk Dempsey, by me upon the initial interpretation at approximately 1057 hours, confirmation received. Thank you for your referral.    Cta Head Neck W Cont    Result Date: 1/4/2021  EXAM: CTA HEAD AND NECK CLINICAL INDICATION/HISTORY: Numbness TECHNIQUE: CTA of the head and neck was performed from the lung apices to the vertex after administration of 80 mL of Isovue intravenous contrast, during the arterial phase. Multiplanar reformats and 3-D reconstructions were produced by the technologist. All CT exams at this location are performed using dose optimization techniques as appropriate to a performed exam including at least one of the following: *  Automated exposure control *  Adjustment of the mA and/or kV according to patient size (this includes techniques or standardized protocols for targeted exams where dose is matched to indication / reason for exam; i.e. extremities or head) *  Use of iterative reconstructive technique COMPARISON: CT head from the same day. MRI brain from the same day. FINDINGS: CTA Head: There is redemonstration of a calcified fourth ventricular mass. There is mildly increased vascularity adjacent to the mass at the cerebellar tonsils. No clearly engorged posterior fossa arteries or veins to suggest arteriovenous shunting. Please see forthcoming MRI for further details. Assessment of distal intracranial arteries is limited by venous contamination. Intracranial segments of internal carotid arteries are unremarkable. Anterior, middle, posterior cerebral arteries are patent. Vertebrobasilar system is patent. There is no arterial occlusion or aneurysm. Visualized dural venous sinuses and deep cerebral veins are patent. CTA Neck: Visualized aorta is normal in caliber. Great vessel origins are patent. Common carotid arteries are widely patent. External carotid arteries are patent. Cervical segments of the vertebral arteries are patent.  Cervical segments of ICAs and vertebral arteries are tortuous, which may relate to underlying hypertension. There is no hemodynamically significant stenosis of cervical internal carotid arteries by NASCET criteria. Incidental note is made of asymmetric hypoattenuation at the right tongue. This suggests fatty infiltration related to hypoglossal nerve palsy. Lung apices are well-aerated. There is large dental cavity and posterior right maxillary molar. IMPRESSION: CTA head: 1. Patent intracranial arterial circulation. 2. Redemonstration of a calcified fourth ventricular mass. There is mildly increased vascularity adjacent to the mass at the cerebellar tonsils. No clearly engorged posterior fossa arteries or veins to suggest arteriovenous shunting. Please see forthcoming MRI for further details. CTA neck: 1. Tortuous neck arteries, which may reflect underlying hypertension. 2. Patent neck arteries. No hemodynamically significant stenosis by NASCET criteria. 3. Incidental note is made of asymmetric hypoattenuation at the right tongue. This suggests fatty infiltration related to right hypoglossal nerve palsy. 4. Large dental cavity and posterior right maxillary molar. Concordant preliminary read by Dr. Gómez Arshad at [phone] on 1/4/2021. The above findings were discussed with Dianna LEES, via telephone by Dr. Gómez Arshad at [phone] on 1/4/2021. Progress notes, Consult notes or additional Procedure notes:     Pt here with left sided facial tingling and left arm tingling. Risk factors of post partum 2 months and h/o pre-eclampsia noted. I saw patient upon arrival to ED and code S started immediately and after d/w ed attending. CT and CTA ordered as well as stroke protocol. Pt evaluated by tele-psych and Dr. Mari Carbone agrees with code stroke and will evaluate pt when she is back from CT.      He evaluated her and recommends further work up to include MRI  ofbrain w/wo, admission to hospitalist, asa if no bleed appreciated, mag sulfate, ativan, iv bolus, in house neuro consult. Dr. Augustus Fleming has discussed pt and he agrees to admit to [facility] for admission and will consult. No further orders given. Does not feel pt needs to be transferred and can safely be managed in [facility] with findings noted on CT/CTA. All findings on CT and CTA discussed with patient. Pt agreeable to admission. Dr. Cullen Calero has evaluated pt and agrees with plan and work up in ED>     Pt admitted by Dr. Estela Bhatt, no further orders michael Starr PA-C     Dispo: Admit   Diagnosis:   1. Left facial numbness        Follow-up Information    None          Patient's Medications   Start Taking    No medications on file   Continue Taking    HYDROCHLOROTHIAZIDE (MICROZIDE) 12.5 MG CAPSULE    Take 1 Cap by mouth daily. IBUPROFEN (MOTRIN) 600 MG TABLET    Take 1 Tab by mouth every six (6) hours as needed for Pain. NIFEDIPINE ER (PROCARDIA XL) 30 MG ER TABLET    Take 1 Tab by mouth daily. OXYCODONE-ACETAMINOPHEN (PERCOCET) 5-325 MG PER TABLET    Take  by mouth every four (4) hours as needed for Pain. PRENATAL VIT-IRON FUMARATE-FA 27 MG IRON- 0.8 MG TAB TABLET    Take 1 Tab by mouth daily.    These Medications have changed    No medications on file   Stop Taking    No medications on file

## 2021-01-04 NOTE — PROGRESS NOTES
MRI Safety Screening form needs to be filled out and FAXED to [phone] BEFORE MRI can be scheduled. If unable to acquire information from patient, [facility] must be contacted, or screening xrays will need to be ordred.     If pt is Claustrophobic or needs Pain Meds, please have these ordered in advance to help facilitate MRI exam.

## 2021-01-04 NOTE — CONSULTS
Neurology Consult    Patient ID:  Veronica Concepcion  [id number]  57 y.o.  1985    Subjective:     Date of Consultation:  2021    Referring Physician:  Nathan Marie MD    Reason for Consultation: numbness    History of Present Illness:   Veronica Concepcion is a 30 y.o. AA woman with left face/arm tingling/numb last night onset. No motor weakness. History of , preeclampsia with BPs labile post recent preg/delivery 2 month ago approx. No blood thinners. no pain or trauma. History of  migraine  past, no abn fx. no sz hx or risks. no COVID risks. She notes left hand numb even feet at times, lasts <15min, for past months post delivery. At this time she has occasional numbness and tingling in feet, and some numbness in left face. No other deficit. There is no family history of neurological disorder. Past Medical History:   Diagnosis Date    Dental caries     Hypertension     Insect bite     Migraine     Sore throat     Strep pharyngitis     Tooth ache       Past Surgical History:   Procedure Laterality Date    HX  SECTION        Family History   Problem Relation Age of Onset    Cancer Other     Diabetes Other     Hypertension Other       Social History     Tobacco Use    Smoking status: Former Smoker    Smokeless tobacco: Never Used   Substance Use Topics    Alcohol use: No      No Known Allergies   Prior to Admission medications    Medication Sig Start Date End Date Taking? Authorizing Provider   oxyCODONE-acetaminophen (PERCOCET) 5-325 mg per tablet Take  by mouth every four (4) hours as needed for Pain. Other, MD Nella   hydroCHLOROthiazide (MICROZIDE) 12.5 mg capsule Take 1 Cap by mouth daily. 20   Mark Wong MD   ibuprofen (MOTRIN) 600 mg tablet Take 1 Tab by mouth every six (6) hours as needed for Pain. 20   Mark Wong MD   NIFEdipine ER (PROCARDIA XL) 30 mg ER tablet Take 1 Tab by mouth daily.   Patient taking differently: Take 60 mg by mouth daily. 11/13/20   Brina Sifuentes MD   prenatal vit-iron fumarate-fa 27 mg iron- 0.8 mg tab tablet Take 1 Tab by mouth daily. Other, MD Nella       Review of Systems:   See the HPI for neurologic and I reviewed all other pertinent review of systems as the this chart, otherwise the following systems are noncontributory including constitutional, eyes, ears, nose, and throat, cardiovascular, respiratory, gastrointestinal, genitourinary, musculoskeletal, skin and/ endocrine, hematologic/lymph, allergic/immunologic and psychiatric       Objective:     Patient Vitals for the past 8 hrs:   BP Temp Pulse Resp SpO2   01/04/21 1115 (!) 136/91    100 %   01/04/21 0935 119/85 97.2 °F (36.2 °C) 80 16 99 %     NEUROLOGICAL FOCUS EXAMINATION:   Mental Status: Awake, alert, oriented to time, place, and person; appropriate. Recent and remote memory intact. No evidence of right-left confusion, finger agnosia, dysnomia or aphasia. CRANIAL NERVES:   II: Visual fields full to confrontation. III, IV, VI: Extraocular movements full throughout, without nystagmus. No ptosis. Pupils equal, round and react briskly to light and accommodation. V: Normal sensation to light touch and pinprick bilaterally. Motor function normal.   VII: No facial asymmetry. VIII: Hears finger rub bilaterally. IX & X: Palate elevates symmetrically bilaterally with phonation. Gag reflex equal bilaterally. XI: Sternocleidomastoid and upper trapezius normal tone, bulk and strength bilaterally. XII: Tongue midline without atrophy or fasciculations. Rapid alternating movements normal. No dysarthria. Motor:   Tone, bulk, and strength are age appropriate normal in both upper/lower extremities. There are no tremors, fasciculations or abnormal involuntary movements. Reflexes (right/left):  DTRs, obtainable. Plantar responses are flexor bilaterally. No pathological reflexes.    Coordination: Finger-nose-finger testing and rapid alternating movements appear to be normal in both upper extremities. Sensation: Intact to pinprick, light touch and position sense throughout. Labs:   Lab Results   Component Value Date/Time    WBC 8.5 01/04/2021 11:07 AM    HCT 36.4 01/04/2021 11:07 AM    HGB 11.8 (L) 01/04/2021 11:07 AM    PLATELET 498 65/97/6747 11:07 AM    Hct, External 38.5 04/17/2020    Hgb, External 13.2 04/17/2020    Platelet cnt., External 286 04/17/2020     Lab Results   Component Value Date/Time    Sodium 139 01/04/2021 11:07 AM    Potassium 3.8 01/04/2021 11:07 AM    Chloride 105 01/04/2021 11:07 AM    CO2 29 01/04/2021 11:07 AM    Glucose 93 01/04/2021 11:07 AM    BUN 10 01/04/2021 11:07 AM    Creatinine 0.77 01/04/2021 11:07 AM    Calcium 9.5 01/04/2021 11:07 AM     No results found for: B12LT, FOL, RBCF    CT Head:  No acute intracranial hemorrhage or mass effect. MRI brain:  1. There is a coarsely calcified, lobulated 1.8 cm mass in the fourth ventricle. 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 Subependymoma is less likely in this  demographic, but could appear similar on imaging. Pilocytic astrocytoma,  hemangioblastoma are possible but would be more likely to have a large cystic  component. Metastasis or choroid plexus papilloma remain less likely  considerations.     2. Concomitant presence of at least 2 thin parafalcine meningiomas raises  suspicion for underlying genetic abnormality such as neurofibromatosis type II,  especially if the above represents ependymoma, though not necessarily.     3. Possible subcentimeter lesions at right jugular foramen and internal auditory  canal, incompletely characterized due to thick slab acquisition. The presence of  these lesions would corroborate the possibility of neurofibromatosis type II.   Postcontrast MPRAGE, IAC sequences are recommended at follow-up.      4. No hydrocephalus or associated mass effect. No cerebral edema or infarct.     5. Incidental note is made of partially empty sella, nonspecific, but sometimes  seen in the setting of elevated intracranial pressure. Assessment:     29 yo female with history of hypertension presents with c/o numbness and tingling in left extremities, face and both feet. No motor deficit. Most sx subsided excerpt intermittent numness and tingling in feet and left face. CT and MRI brain showed a irregular high signal calcified lesion in wall of 4th ventricle. Radiologist provides multiple differential diagnosis, and suspects ependymoma, .this finding does not correlate with her index symptoms. TIA is a possibility, especial there is underlying risk factor (hypertension). Parethesia ) mansi in feet) may also suggest of neuropathic process. Calcified lesion in 4th ventricle, ?ependymoma. TIA  Hypertension  Peripheral neuropathy      Plan:   Optimize BP management, ASA, wt reduction  Further CV risk factor identification and management. Ref to neurosurgery and neuroradiologist for definitive diagnosis and management.        Signed By:  Dee Morris MD   Neurology    January 4, 2021

## 2021-01-05 ENCOUNTER — APPOINTMENT (OUTPATIENT)
Dept: MRI IMAGING | Age: 36
End: 2021-01-05
Attending: INTERNAL MEDICINE
Payer: MEDICAID

## 2021-01-05 VITALS
WEIGHT: 188.43 LBS | SYSTOLIC BLOOD PRESSURE: 132 MMHG | HEART RATE: 86 BPM | BODY MASS INDEX: 35.6 KG/M2 | OXYGEN SATURATION: 98 % | TEMPERATURE: 98.8 F | RESPIRATION RATE: 17 BRPM | DIASTOLIC BLOOD PRESSURE: 90 MMHG

## 2021-01-05 LAB
ATRIAL RATE: 61 BPM
CALCULATED P AXIS, ECG09: 60 DEGREES
CALCULATED R AXIS, ECG10: 1 DEGREES
CALCULATED T AXIS, ECG11: 29 DEGREES
CHOLEST SERPL-MCNC: 188 MG/DL
DIAGNOSIS, 93000: NORMAL
ERYTHROCYTE [DISTWIDTH] IN BLOOD BY AUTOMATED COUNT: 16 % (ref 11.6–14.5)
ERYTHROCYTE [SEDIMENTATION RATE] IN BLOOD: 13 MM/HR (ref 0–20)
EST. AVERAGE GLUCOSE BLD GHB EST-MCNC: 117 MG/DL
GLUCOSE BLD STRIP.AUTO-MCNC: 112 MG/DL (ref 70–110)
HBA1C MFR BLD: 5.7 % (ref 4.2–5.6)
HCT VFR BLD AUTO: 39.6 % (ref 35–45)
HDLC SERPL-MCNC: 51 MG/DL (ref 40–60)
HDLC SERPL: 3.7 {RATIO} (ref 0–5)
HGB BLD-MCNC: 13 G/DL (ref 12–16)
LDLC SERPL CALC-MCNC: 115.2 MG/DL (ref 0–100)
LIPID PROFILE,FLP: ABNORMAL
MCH RBC QN AUTO: 28.2 PG (ref 24–34)
MCHC RBC AUTO-ENTMCNC: 32.8 G/DL (ref 31–37)
MCV RBC AUTO: 85.9 FL (ref 74–97)
P-R INTERVAL, ECG05: 184 MS
PLATELET # BLD AUTO: 317 K/UL (ref 135–420)
PMV BLD AUTO: 11 FL (ref 9.2–11.8)
Q-T INTERVAL, ECG07: 412 MS
QRS DURATION, ECG06: 88 MS
QTC CALCULATION (BEZET), ECG08: 414 MS
RBC # BLD AUTO: 4.61 M/UL (ref 4.2–5.3)
TRIGL SERPL-MCNC: 109 MG/DL (ref ?–150)
TSH SERPL DL<=0.05 MIU/L-ACNC: 0.32 UIU/ML (ref 0.36–3.74)
VENTRICULAR RATE, ECG03: 61 BPM
VLDLC SERPL CALC-MCNC: 21.8 MG/DL
WBC # BLD AUTO: 9.1 K/UL (ref 4.6–13.2)

## 2021-01-05 PROCEDURE — 96372 THER/PROPH/DIAG INJ SC/IM: CPT

## 2021-01-05 PROCEDURE — 99218 HC RM OBSERVATION: CPT

## 2021-01-05 PROCEDURE — 74011250636 HC RX REV CODE- 250/636: Performed by: INTERNAL MEDICINE

## 2021-01-05 PROCEDURE — 84443 ASSAY THYROID STIM HORMONE: CPT

## 2021-01-05 PROCEDURE — 97161 PT EVAL LOW COMPLEX 20 MIN: CPT

## 2021-01-05 PROCEDURE — 97165 OT EVAL LOW COMPLEX 30 MIN: CPT

## 2021-01-05 PROCEDURE — A9577 INJ MULTIHANCE: HCPCS | Performed by: INTERNAL MEDICINE

## 2021-01-05 PROCEDURE — 99239 HOSP IP/OBS DSCHRG MGMT >30: CPT | Performed by: INTERNAL MEDICINE

## 2021-01-05 PROCEDURE — 99231 SBSQ HOSP IP/OBS SF/LOW 25: CPT | Performed by: PSYCHIATRY & NEUROLOGY

## 2021-01-05 PROCEDURE — 83036 HEMOGLOBIN GLYCOSYLATED A1C: CPT

## 2021-01-05 PROCEDURE — 72156 MRI NECK SPINE W/O & W/DYE: CPT

## 2021-01-05 PROCEDURE — 36415 COLL VENOUS BLD VENIPUNCTURE: CPT

## 2021-01-05 PROCEDURE — 72157 MRI CHEST SPINE W/O & W/DYE: CPT

## 2021-01-05 PROCEDURE — 93005 ELECTROCARDIOGRAM TRACING: CPT

## 2021-01-05 PROCEDURE — 74011250637 HC RX REV CODE- 250/637: Performed by: INTERNAL MEDICINE

## 2021-01-05 PROCEDURE — 72158 MRI LUMBAR SPINE W/O & W/DYE: CPT

## 2021-01-05 PROCEDURE — 82962 GLUCOSE BLOOD TEST: CPT

## 2021-01-05 PROCEDURE — 92610 EVALUATE SWALLOWING FUNCTION: CPT

## 2021-01-05 PROCEDURE — 85027 COMPLETE CBC AUTOMATED: CPT

## 2021-01-05 PROCEDURE — 85652 RBC SED RATE AUTOMATED: CPT

## 2021-01-05 PROCEDURE — 80061 LIPID PANEL: CPT

## 2021-01-05 PROCEDURE — 2709999900 HC NON-CHARGEABLE SUPPLY

## 2021-01-05 RX ORDER — GUAIFENESIN 100 MG/5ML
81 LIQUID (ML) ORAL DAILY
Qty: 30 TAB | Refills: 0 | Status: SHIPPED | OUTPATIENT
Start: 2021-01-06

## 2021-01-05 RX ORDER — POTASSIUM CHLORIDE 1500 MG/1
20 TABLET, FILM COATED, EXTENDED RELEASE ORAL DAILY
Qty: 30 TAB | Refills: 0 | Status: SHIPPED | OUTPATIENT
Start: 2021-01-05

## 2021-01-05 RX ORDER — FAMOTIDINE 20 MG/1
20 TABLET, FILM COATED ORAL EVERY EVENING
Qty: 30 TAB | Refills: 0 | Status: SHIPPED | OUTPATIENT
Start: 2021-01-05

## 2021-01-05 RX ORDER — NIFEDIPINE 30 MG/1
30 TABLET, EXTENDED RELEASE ORAL DAILY
Status: DISCONTINUED | OUTPATIENT
Start: 2021-01-05 | End: 2021-01-05 | Stop reason: HOSPADM

## 2021-01-05 RX ORDER — HYDROCHLOROTHIAZIDE 12.5 MG/1
12.5 TABLET ORAL DAILY
Qty: 30 TAB | Refills: 0 | Status: SHIPPED | OUTPATIENT
Start: 2021-01-05

## 2021-01-05 RX ORDER — AMLODIPINE BESYLATE 5 MG/1
5 TABLET ORAL DAILY
Qty: 30 TAB | Refills: 0 | Status: SHIPPED | OUTPATIENT
Start: 2021-01-05

## 2021-01-05 RX ORDER — POTASSIUM CHLORIDE 20 MEQ/1
40 TABLET, EXTENDED RELEASE ORAL 3 TIMES DAILY
Status: DISCONTINUED | OUTPATIENT
Start: 2021-01-05 | End: 2021-01-05 | Stop reason: HOSPADM

## 2021-01-05 RX ADMIN — GADOBENATE DIMEGLUMINE 15 ML: 529 INJECTION, SOLUTION INTRAVENOUS at 09:40

## 2021-01-05 RX ADMIN — HEPARIN SODIUM 5000 UNITS: 5000 INJECTION INTRAVENOUS; SUBCUTANEOUS at 00:53

## 2021-01-05 RX ADMIN — ACETAMINOPHEN 650 MG: 325 TABLET ORAL at 01:03

## 2021-01-05 RX ADMIN — FAMOTIDINE 20 MG: 20 TABLET, FILM COATED ORAL at 17:05

## 2021-01-05 RX ADMIN — POTASSIUM CHLORIDE 40 MEQ: 1500 TABLET, EXTENDED RELEASE ORAL at 17:08

## 2021-01-05 RX ADMIN — ASPIRIN 81 MG: 81 TABLET, CHEWABLE ORAL at 10:23

## 2021-01-05 NOTE — PROGRESS NOTES
Problem: Falls - Risk of  Goal: *Absence of Falls  Description: Document Miteshoxanaporfirio LopezBartlett Fall Risk and appropriate interventions in the flowsheet.   Outcome: Progressing Towards Goal  Note: Fall Risk Interventions:            Medication Interventions: Patient to call before getting OOB                   Problem: Patient Education: Go to Patient Education Activity  Goal: Patient/Family Education  Outcome: Progressing Towards Goal

## 2021-01-05 NOTE — PROGRESS NOTES
Follow up with patient as she and her mother wants updates. Spoke with Neurology for further needs of neurosurgery consult, likely outpatient at [facility].    Will do MRI whole spine to further evaluation of mass if this is concern for neurofibromatosis type Nathalie Gloria MD

## 2021-01-05 NOTE — DISCHARGE INSTRUCTIONS
Patient Education        Numbness and Tingling: Care Instructions  Your Care Instructions     Many things can cause numbness or tingling. Swelling may put pressure on a nerve. This could cause you to lose feeling or have a pins-and-needles sensation on part of your body. Nerves may be damaged from trauma, toxins, or diseases, such as diabetes or multiple sclerosis (MS). Sometimes, though, the cause is not clear. If there is no clear reason for your symptoms, and you are not having any other symptoms, your doctor may suggest watching and waiting for a while to see if the numbness or tingling goes away on its own. Your doctor may want you to have blood or nerve tests to find the cause of your symptoms. Follow-up care is a key part of your treatment and safety. Be sure to make and go to all appointments, and call your doctor if you are having problems. It's also a good idea to know your test results and keep a list of the medicines you take. How can you care for yourself at home? · If your doctor prescribes medicine, take it exactly as directed. Call your doctor if you think you are having a problem with your medicine. · If you have any swelling, put ice or a cold pack on the area for 10 to 20 minutes at a time. Put a thin cloth between the ice and your skin. When should you call for help? Call 911 anytime you think you may need emergency care. For example, call if:    · You have weakness, numbness, or tingling in both legs.     · You lose bowel or bladder control.     · You have symptoms of a stroke. These may include:  ? Sudden numbness, tingling, weakness, or loss of movement in your face, arm, or leg, especially on only one side of your body. ? Sudden vision changes. ? Sudden trouble speaking. ? Sudden confusion or trouble understanding simple statements. ? Sudden problems with walking or balance. ? A sudden, severe headache that is different from past headaches.    Watch closely for changes in your health, and be sure to contact your doctor if you have any problems, or if:    · You do not get better as expected. Where can you learn more? Go to http://www.ShelfFlip.com/  Enter U128 in the search box to learn more about \"Numbness and Tingling: Care Instructions. \"  Current as of: 2019               Content Version: 12.6  © 6595-1006 [employer]. Care instructions adapted under license by [employer] (which disclaims liability or warranty for this information). If you have questions about a medical condition or this instruction, always ask your healthcare professional. [place] [address] any warranty or liability for your use of this information. Patient armband removed and shredded  [employer] Activation    Thank you for requesting access to [employer]. Please follow the instructions below to securely access and download your online medical record. [employer] allows you to send messages to your doctor, view your test results, renew your prescriptions, schedule appointments, and more. How Do I Sign Up? 1. In your internet browser, go to [email or web address].  2. Click on the First Time User? Click Here link in the Sign In box. You will be redirect to the New Member Sign Up page. 3. Enter your [employer] Access Code exactly as it appears below. You will not need to use this code after youve completed the sign-up process. If you do not sign up before the expiration date, you must request a new code. [employer] Access Code: [email or web address]  Expires: 2021 10:06 AM (This is the date your [employer] access code will )    4. Enter the last four digits of your Social Security Number (xxxx) and Date of Birth (mm/dd/yyyy) as indicated and click Submit. You will be taken to the next sign-up page. 5. Create a [employer] ID.  This will be your [employer] login ID and cannot be changed, so think of one that is secure and easy to remember. 6. Create a [employer] password. You can change your password at any time. 7. Enter your Password Reset Question and Answer. This can be used at a later time if you forget your password. 8. Enter your e-mail address. You will receive e-mail notification when new information is available in [address] 9. Click Sign Up. You can now view and download portions of your medical record. 10. Click the Download Summary menu link to download a portable copy of your medical information. Additional Information    If you have questions, please visit the Frequently Asked Questions section of the [employer] website at [email or web address] Remember, [employer] is NOT to be used for urgent needs. For medical emergencies, dial 911. As part of the discharge instructions, medications already given today were discussed with the patient. The next dose due of all ordered meds was highlighted as part of the medication discharge instructions. Discussed with the patient the importance of taking medications as directed, as well as the side effects and adverse reactions to medications ordered. DISCHARGE SUMMARY from Nurse    PATIENT INSTRUCTIONS:    After general anesthesia or intravenous sedation, for 24 hours or while taking prescription Narcotics:  · Limit your activities  · Do not drive and operate hazardous machinery  · Do not make important personal or business decisions  · Do  not drink alcoholic beverages  · If you have not urinated within 8 hours after discharge, please contact your surgeon on call.     Report the following to your surgeon:  · Excessive pain, swelling, redness or odor of or around the surgical area  · Temperature over 100.5  · Nausea and vomiting lasting longer than 4 hours or if unable to take medications  · Any signs of decreased circulation or nerve impairment to extremity: change in color, persistent  numbness, tingling, coldness or increase pain  · Any questions    What to do at Home:  Recommended activity: Activity as tolerated,     If you experience any of the following symptoms shortness of breath, chest pain, numbness, tingling, in face, arms, or legs,. Slurred speech and difficulty walking or with balance, please follow up with Emergency Department or Primary Md. *  Please give a list of your current medications to your Primary Care Provider. *  Please update this list whenever your medications are discontinued, doses are      changed, or new medications (including over-the-counter products) are added. *  Please carry medication information at all times in case of emergency situations. These are general instructions for a healthy lifestyle:    No smoking/ No tobacco products/ Avoid exposure to second hand smoke  Surgeon General's Warning:  Quitting smoking now greatly reduces serious risk to your health. Obesity, smoking, and sedentary lifestyle greatly increases your risk for illness    A healthy diet, regular physical exercise & weight monitoring are important for maintaining a healthy lifestyle    You may be retaining fluid if you have a history of heart failure or if you experience any of the following symptoms:  Weight gain of 3 pounds or more overnight or 5 pounds in a week, increased swelling in our hands or feet or shortness of breath while lying flat in bed. Please call your doctor as soon as you notice any of these symptoms; do not wait until your next office visit. The discharge information has been reviewed with the patient. The patient verbalized understanding. Discharge medications reviewed with the patient and appropriate educational materials and side effects teaching were provided.   ___________________________________________________________________________________________________________________________________

## 2021-01-05 NOTE — PROGRESS NOTES
PT orders received, chart reviewed. Pt unable to participate with PT due to:  []  Nausea/vomiting  []  Eating  []  Pain  []  Pt lethargic  [x]  Off Unit  []  Pt refused  []  Other   Will f/u later as patient's schedule allows. Thank you for this referral.  Altagracia Truong, PT, DPT

## 2021-01-05 NOTE — ROUTINE PROCESS
Bedside shift change report given to fer BURKS (oncoming nurse) by Saleem Cruz RN 
 (offgoing nurse). Report included the following information SBAR, Kardex, MAR and Recent Results.

## 2021-01-05 NOTE — PROGRESS NOTES
Reason for Admission:  Facial tingling sensation [R20.2]  Numbness and tingling in left hand [R20.0, R20.2]  Left facial numbness [R20.0]                 RUR Score:    11%            Plan for utilizing home health:    No, not at this time. Patient is ambulatory, and self-care. Likelihood of Readmission:   LOW                         Transition of Care Plan:              Initial assessment completed with patient at the bedside. Cognitive status of patient: oriented to time, place, person and situation. Face sheet information confirmed:  yes. The patient designates her relative, Bc Braswell [phone] and her mother Elicia Townsend [phone] to participate in her discharge plan and to receive any needed information. This patient lives in a single family home with her kids. Patient is able to navigate steps as needed. Prior to hospitalization, patient was considered to be independent with ADLs/IADLS : yes . Patient has a current ACP document on file: no. The patient will be available to transport herself home upon discharge. The patient already has none reported,  medical equipment available in the home. Patient is not currently active with home health. Patient has not stayed in a skilled nursing facility or rehab. This patient is on dialysis :no.    Currently, the discharge plan is Home. The patient states that she can obtain her medications from the pharmacy, and take her medications as directed. Patient's current insurance is [employer]. Care Management Interventions  PCP Verified by CM:  Yes  Mode of Transport at Discharge: Self(Patient drove her self to the hospital.)  Transition of Care Consult (CM Consult): Discharge Planning  Discharge Durable Medical Equipment: No  Physical Therapy Consult: Yes  Occupational Therapy Consult: Yes  Speech Therapy Consult: Yes  Current Support Network: Own Home(Patient lives with her kids.)  Confirm Follow Up Transport: Self  Discharge Location  Discharge Placement: [address] Isabel Romero RN  Case Management [phone]

## 2021-01-05 NOTE — PROGRESS NOTES
ARU/IPR REFERRAL CONTACT NOTE  [id number] [place] [address] for Physical Rehabilitation    RE: Bossman Vazquez     Thank you for the opportunity to review this patient's case for admission to [address] [place] [address] for Physical Rehabilitation. Based on our pre-admission screening:     [x ] This patient does not meet criteria for admission to [facility] for Physical Rehabilitation due to: Too functional, per documentation, patient does not require both Physical and Occupational Therapy Services at an acute rehabilitation level of intensity. Again, Thank you for this referral. Should you have any questions please do not hesitate to call. Sincerely,  Hunter Boyd. Lester Stallworth, [address]  Lester Stallworth, RN  Admissions [facility] for Physical Rehabilitation  [phone]

## 2021-01-05 NOTE — ROUTINE PROCESS
Md  Called because patient stated that she didn't take procardia anymore. The patient said that she takes norvasc and hctz. Md was called and made aware that the patient needed these medication and that this was a change in her medications.

## 2021-01-05 NOTE — PROGRESS NOTES
Speech Pathology:     Attempted evaluation; however, pt currently off unit. Will attempt at a later date/time or as medical condition permits.      Thank you for this referral.    Rimma Bay M.S. CCC-SLP/L  Speech-Language Pathologist

## 2021-01-05 NOTE — ROUTINE PROCESS
Stroke Education provided to patient and the following topics were discussed 1. Patients personal risk factors for stroke are hypertension and obesity 2. Warning signs of Stroke: * Sudden numbness or weakness of the face, arm or leg, especially on one side of The body * Sudden confusion, trouble speaking or understanding * Sudden trouble seeing in one or both eyes * Sudden trouble walking, dizziness, loss of balance or coordination * Sudden severe headache with no known cause 3. Importance of activation Emergency Medical Services ( 9-1-1 ) immediately if experience any warning signs of stroke. 4. Be sure and schedule a follow-up appointment with your primary care doctor or any specialists as instructed. 5. You must take medicine every day to treat your risk factors for stroke. Be sure to take your medicines exactly as your doctor tells you: no more, no less. Know what your medicines are for , what they do. Anti-thrombotics /anticoagulants can help prevent strokes. You are taking the following medicine(s)  see mar 6. Smoking and second-hand smoke greatly increase your risk of stroke, cardiovascular disease and death. Smoking history never 7. Information provided was [facility] Stroke Education [employer] 8. Documentation of teaching completed in Patient Education Activity and on Care Plan with teaching response noted? yes

## 2021-01-05 NOTE — PROGRESS NOTES
ARU/IPR REFERRAL CONTACT NOTE  [facility] Physical Rehabilitation    RE: Audie Rosario  Referral received to review this patient's case for admission to [facility] Physical Rehabilitation. Current status reviewed.  When appropriate, will need PT/OT/ST evaluation/treatment on this patient to complete the pre-admission evaluation.  Will continue to follow. Thank you for this referral.  Should you have any questions please do not hesitate to call. Sincerely,  Kinsey Siu.  [address] [facility]  [phone]

## 2021-01-05 NOTE — PROGRESS NOTES
Neuro f/u    Chart reviewed, patient examined. No complaints, asymptomatic neurologically. Numbness subsided. Has MRI of spine axix, all negative. Neuro exam  Normal mental status. Cranial nerve intact  Motor/sensory intact    IMP:  Calcified mass lesion in 4th ventricle, ? Ependymoma or others    Rx   to see neurosurgery    Discussed with patient at length. Uncertain nature of MRI finding. To follow-up with neurosurgery for definitive diagnosis.   ___________________  Dee Morris MD

## 2021-01-05 NOTE — ROUTINE PROCESS
Bedside and Verbal shift change report given to [facility] RN (oncoming nurse) by Sancho Can RN (offgoing nurse). Report given with SBAR, Kardex, Intake/Output, MAR, Accordion and Recent Results.

## 2021-01-05 NOTE — PROGRESS NOTES
[facility]  Progress Note    Patient: Raul Mills Age: 30 y.o. : 1985 MR#: [id number] SSN: [id number]  Date/Time: 2021     C/C: At onset of left hand paresthesias      Subjective:   HPI : Patient with history of morbid obesity hypertension recent preeclampsia during pregnancy admitted for left hand paresthesias which persisted for 2 days or more. Review of Systems:  positive responses in bold type   Constitutional: Negative for fever, chills, diaphoresis and unexpected weight change. HENT: Negative for ear pain, congestion, sore throat, rhinorrhea, drooling, trouble swallowing, neck pain and tinnitus. Eyes: Negative for photophobia, pain, redness and visual disturbance. Respiratory: negative for shortness of breath, cough, choking, chest tightness, wheezing or stridor. Cardiovascular: Negative for chest pain, palpitations and leg swelling. Gastrointestinal: Negative for nausea, vomiting, abdominal pain, diarrhea, constipation, blood in stool, abdominal distention and anal bleeding. Genitourinary: Negative for dysuria, urgency, frequency, hematuria, flank pain and difficulty urinating. Musculoskeletal: Negative for back pain and arthralgias. Skin: Negative for color change, rash and wound. Neurological: Paresthesias left side of the body  Hematological: Does not bruise/bleed easily. Psychiatric/Behavioral: Negative for suicidal ideas, hallucinations, behavioral problems, self-injury or agitation     Assessment/Plan:     1. Left-sided paresthesia  2 hypertension  3 hypokalemia  4 hyperlipidemia    Plan  Stable  Management of blood pressure  Replace potassium  Outpatient follow-up for neurosurgical consultation due to abnormal MRI        Time spent on direct patient care >30 mints     Complexity : High complex - due to multiple medical issues outlined above.      CODE Status :     Case discussed with:  [x]Patient  [] Family []Nursing  []Case Management   DVT Prophylaxis:  []Lovenox  []Hep SQ  []SCDs  []Coumadin   []On Heparin gtt      Objective:   VS:   Visit Vitals  /77 (BP 1 Location: Right arm, BP Patient Position: At rest)   Pulse 83   Temp 97.6 °F (36.4 °C)   Resp 18   Wt 85.5 kg (188 lb 6.8 oz) Comment: 2020   SpO2 98%   BMI 35.60 kg/m²      Tmax/24hrs: Temp (24hrs), Av.9 °F (36.6 °C), Min:97.5 °F (36.4 °C), Max:98.5 °F (36.9 °C)  IOBRIEFNo intake or output data in the 24 hours ending 21 1559    General:  Alert, cooperative, no acute distress HEENT: No facial asymmetry, KAMLA Robinson, External ears - WNL    Cardiovascular: S1S2 - regular , No Murmur   Pulmonary: Equal expansion , No Use of accessory muscles , No Rales No Rhonchi    GI:  +BS in all four quadrants, soft, non-tender  Extremities:  No edema; 2+ dorsalis pedis pulses bilaterally  Neuro: Alert and oriented X 2. Medications:   Current Facility-Administered Medications   Medication Dose Route Frequency    labetaloL (NORMODYNE;TRANDATE) 20 mg/4 mL (5 mg/mL) injection 5 mg  5 mg IntraVENous Q10MIN PRN    heparin (porcine) injection 5,000 Units  5,000 Units SubCUTAneous Q8H    famotidine (PEPCID) tablet 20 mg  20 mg Oral QPM    polyethylene glycol (MIRALAX) packet 17 g  17 g Oral DAILY PRN    acetaminophen (TYLENOL) tablet 650 mg  650 mg Oral Q4H PRN    aspirin chewable tablet 81 mg  81 mg Oral DAILY       Labs:    Recent Labs     21  1242 21  1107   WBC 9.1 8.5   HGB 13.0 11.8*   HCT 39.6 36.4    285     Recent Labs     21  1107      K 3.8      CO2 29   GLU 93   BUN 10   CREA 0.77   CA 9.5   INR 1.1         Disclaimer: Sections of this note are dictated utilizing voice recognition software, which may have resulted in some phonetic based errors in grammar and contents. Even though attempts were made to correct all the mistakes, some may have been missed, and remained in the body of the document.  If questions arise, please contact our department.     Signed By: Connie Wolf MD     January 5, 2021

## 2021-01-05 NOTE — PROGRESS NOTES
CM updated patient's PCP per request in patient's chart.          Mhedi Hernandez, RN  Case Management [phone]

## 2021-01-05 NOTE — PROGRESS NOTES
Problem: Mobility Impaired (Adult and Pediatric)  Goal: *Acute Goals and Plan of Care (Insert Text)  Outcome: Resolved/Met   PHYSICAL THERAPY EVALUATION AND DISCHARGE    Patient: Karolina Kinney (38 y.o. female)  Date: 2021  Primary Diagnosis: Facial tingling sensation [R20.2]  Numbness and tingling in left hand [R20.0, R20.2]  Left facial numbness [R20.0]        Precautions:   (standard)    PLOF: Independent with mobility including gait no AD. Pt lives with her children (14 year old, 10year old, and 1 month old) in a 2nd floor apartment. ASSESSMENT :  PT orders received and patient cleared by nursing to participate with therapy. Patient is a 30 y.o. female admitted to the hospital due to UE numbness (during PT session pt complains of R UE numbness still). Patient consents to PT evaluation and treatment. Pt has functional B LE strength and good sitting/standing balance. Pt is independent and safe with bed mobility, transfers, and gait. Deferring B UE strength/mobility to OT. Pt educated on safety while in the hospital and at home. Pt ended therapy sitting edge of bed with all needs met. Patient does not require further skilled physical therapy at this level of care. PLAN :  Recommendations and Planned Interventions:    No skilled inpatient physical therapy needs identified at this time. Discharge Recommendations: None  Further Equipment Recommendations for Discharge: N/A     SUBJECTIVE:   Patient stated I've been walking around.     OBJECTIVE DATA SUMMARY:     Past Medical History:   Diagnosis Date    Dental caries     Hypertension     Insect bite     Migraine     Sore throat     Strep pharyngitis     Tooth ache      Past Surgical History:   Procedure Laterality Date    HX  SECTION       Barriers to Learning/Limitations: None  Compensate with: N/A  Home Situation:   Home Situation  Home Environment: Apartment  # Steps to Enter: 12  Rails to Enter: Yes  Hand Rails : Bilateral  One/Two Story Residence: One story  Living Alone: No  Support Systems: Family member(s)  Patient Expects to be Discharged to[de-identified] Apartment  Current DME Used/Available at Home: None  Critical Behavior:  Neurologic State: Alert  Orientation Level: Oriented X4  Cognition: Follows commands  Safety/Judgement: Fall prevention  Psychosocial  Patient Behaviors: Calm; Cooperative                 B LE Strength:    Strength: Within functional limits              B LE Tone & Sensation:   Tone: Normal          Sensation: Impaired(Pt reports numbness in RUE)           B LE Range Of Motion:  AROM: Within functional limits                 Posture:  Posture (WDL): Within defined limits     Functional Mobility:  Bed Mobility:  Rolling: Independent  Supine to Sit: Independent  Sit to Supine: Independent  Scooting: Independent  Transfers:  Sit to Stand: Independent  Stand to Sit: Independent    Balance:   Sitting: Intact  Standing: Intact; Without support    Ambulation/Gait Training:  Distance (ft): 40 Feet (ft)  Assistive Device: (none)  Ambulation - Level of Assistance: Independent   Gait Abnormalities: (WFL)             Stairs:   No issues per pt; pt able to march in place       Therapeutic Exercises:   Reviewed and performed ankle pumps to increase blood flow and circulation. Pain:  No pain noted before, during, or at end of session. Activity Tolerance:   good  Please refer to the flowsheet for vital signs taken during this treatment. After treatment:   []         Patient left in no apparent distress sitting up in chair  [x]         Patient left in no apparent distress in bed  [x]         Call bell left within reach  [x]   Personal items in reach  []         Nursing notified   []         Caregiver present  []         Bed/chair alarm activated  []         SCDs applied       COMMUNICATION/EDUCATION:   [x]         Role of Physical Therapy in the acute care setting.   [x]         Fall prevention education was provided and the patient/caregiver indicated understanding. [x]         Patient/family have participated as able in goal setting and plan of care. [x]         Patient/family agree to work toward stated goals and plan of care. []         Patient understands intent and goals of therapy, but is neutral about his/her participation. []         Patient is unable to participate in goal setting/plan of care: ongoing with therapy staff. [x]         Out of bed at least 3-5 times a day. []         Other:     Thank you for this referral.  Celena Mazariegos, PT, DPT   Time Calculation: 8 mins      Eval Complexity: History: MEDIUM  Complexity : 1-2 comorbidities / personal factors will impact the outcome/ POC Exam:HIGH Complexity : 4+ Standardized tests and measures addressing body structure, function, activity limitation and / or participation in recreation  Presentation: LOW Complexity : Stable, uncomplicated  Clinical Decision Making:Low Complexity    Overall Complexity:LOW

## 2021-01-05 NOTE — DISCHARGE SUMMARY
Discharge Summary     Patient ID:  Haseeb Oar  [id number]  50 y.o.  1985  Body mass index is 35.6 kg/m². PCP on record: Lalit Paulson MD    Admit date: 1/4/2021  Discharge date and time: 1/5/2021    Discharge Diagnoses:                                           1.  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         Consults: Neurology          Hospital Course by problems:    Patient with left-sided paresthesias tingling numbness about 2 to 3 days, blood pressure hyperlipidemia obesity, was admitted for further work-up for possible stroke stroke was ruled out by MRI however MRI cervical abnormal mass in the brain MRI report posted below. Neurology was consulted. Nothing acute. But patient needs to follow-up with outpatient neurology and neurosurgery this can be arranged by her PCP this has been discussed with the patient    Patient is stable for discharge          Patient seen and examined by me on discharge day. Pertinent Findings:  Stable    Significant Diagnostic Studies:  Results  MRI CERV SPINE W WO CONT (Accession [id number]) (Order [id number])  Allergies     No Known Allergies   Exam Information    Status Exam Begun  Exam Ended    Final [99] 1/05/2021 07:40 1/05/2021 10:06 AM [id number] 10:06 AM   Result Information    Status: Final result (Exam End: 1/5/2021 10:06) Provider Status: Open   Study Result    PROCEDURE: MRI of the cervical spine with and without contrast.     CPT CODE: 85337     PROVIDED REASON FOR EXAM: \"CNS mass\"  HISTORY: 1.8 cm lobulated partly calcified mass in the fourth ventricle on brain  mri  COMPARISONS: Brain MRI 1/4/2021.     TECHNIQUE: T1 weighted, T2 FSE, and FSE inversion recovery sagittal images are  supplemented by T2 weighted axial images.  Post-contrast T1 weighted with fat  saturation sagittal and post contrast T1 weighted axial images were  supplemented. Patient received 15 mL Dotarem IV contrast.        FINDINGS:     Sagittal images reveal normal vertebral body morphology. No fractures noted. AP  alignment shows no subluxations. Some reversal of normal cervical lordosis, may  be positional.  No suspicious bone lesions. No abnormal marrow signal present. Disc spaces are maintained. Visualized prevertebral soft tissues are unremarkable. Atlanto-dens interval normal.    No Chiari 1 malformation.     As on the comparison brain MRI there is a lobulated enhancing lesion in the  fourth ventricle, more completely imaged on MRI. Measures 1.8 cm craniocaudal,  0.7 cm anterior to posterior. Not included on the axial T1 postcontrast images.     On the sagittal T2 and STIR images normal signal within the cervical and upper  thoracic spinal cord. On axial and sagittal T1 postcontrast images no enhancing  cord lesions. No abnormal enhancement in the epidural spaces, disc spaces, or  vertebral bodies.     Correlation of axial and sagittal images reveals the following:     At C2-C3: No significant disc pathology or proliferative changes. No central  canal or foraminal stenosis.     At C3-C4: A small posterior central disc protrusion with slight indentation of  the ventral CSF space, contact with the ventral cord but no cord compression. CSF remains visible dorsal to the cord, the AP diameter of the residual central  canal is 9 mm. No foraminal narrowing.     At C4-C5: No focal posterior disc pathology. No central spinal canal stenosis or  cord compression. No facet arthropathy or foraminal stenosis     At C5-C6: Small posterior central disc protrusion. Indentation of the ventral  CSF space and contact with the ventral cord. CSF remains visible dorsal to the  spinal cord at this level, mild central spinal canal stenosis.  No significant  facet arthropathy, no foraminal narrowing     At C6-C7: No focal posterior disc pathology or central canal stenosis. No  foraminal narrowing.     At C7-T1: No significant disc pathology or proliferative changes. No central  canal or foraminal stenosis.     Included upper thoracic disc levels appear normal.        IMPRESSION  IMPRESSION:     1. Redemonstrated 1.8 cm enhancing lobulated mass in the fourth ventricle. Better imaged on previous day brain MRI. 2. No mass lesion identified within the cervical spinal cord, vertebral bodies,  or epidural spaces. Normal cervical cord signal throughout. 3. Relatively mild degenerative disc disease in the cervical spine, most  pronounced at C5/C6 with small posterior disc protrusion causing mild central  canal narrowing. No cord compression or foraminal stenosis. Details in the body  of the report. Imaging    MRI CERV SPINE W WO CONT (Order: [id number]) - 1/4/2021    Results  MRI LUMB SPINE W WO CONT (Accession [id number]) (Order [id number])  Allergies     No Known Allergies   Exam Information    Status Exam Begun  Exam Ended    Final [99] 1/05/2021 07:41 1/05/2021 10:05 AM [id number] 10:05 AM   Result Information    Status: Final result (Exam End: 1/5/2021 10:05) Provider Status: Open   Study Result    MR lumbar spine with and without contrast     CPT CODE: 47570     HISTORY:  CNS mass     COMPARISON: None.     TECHNIQUE: Lumbar spine scanned with axial and sagittal T1W scans, axial and  sagittal T2W scans, and with post gadolinium axial and sagittal T1W scans. Patient received 15 mL Dotarem IV contrast.     FINDINGS:       Alignment is normal.  Vertebral body heights are normal.  No acute or chronic fractures. Discs are normal in height and signal.  Marrow signal is not pathologic. Conus terminates at the L1 level with normal signal.  Postcontrast images show normal signal within the conus medullaris and cauda  equina nerve roots. No abnormal enhancement or intrathecal mass lesion.  No  pathologic enhancement of the vertebral bodies or disc spaces.     T12-L1: No focal disc pathology. No central canal or foraminal stenosis.      L1-L2: No focal disc pathology. No central canal or foraminal stenosis.       L2-L3: No focal disc pathology. No central canal or foraminal stenosis.       L3-L4: No focal disc pathology. No central canal or foraminal stenosis.     Beginning at the mid L4 level relative short pedicles with component of mild  congenital spinal canal narrowing.     L4-L5: No focal disc pathology. Mild congenital spinal canal narrowing but no  significant central stenosis. No foraminal narrowing.     L5-S1: No focal disc pathology or central canal stenosis. The mild congenital  spinal canal narrowing does not result in significant central canal stenosis. No  significant facet arthropathy, no foraminal narrowing.       IMPRESSION  IMPRESSION:     1. Normal signal in the conus medullaris, cauda equina nerve roots. No mass  lesion. 2. No focal disc pathology or central spinal canal stenosis in the lumbar spine. A mild degree of congenital spinal canal narrowing in the lower lumbar spine at  L4 and L5 but no central stenosis. Results  MRI [facility] SPINE W WO CONT (Accession [id number]) (Order [id number])  Allergies     No Known Allergies   Exam Information    Status Exam Begun  Exam Ended    Final [99] 1/05/2021 07:41 1/05/2021  9:53 AM [id number]  9:53 AM   Result Information    Status: Final result (Exam End: 1/5/2021 09:53) Provider Status: Open   Study Result    MRI Thoracic With And Without Contrast     CPT CODE: 36669     PROVIDED REASON FOR EXAM: CNS mass. HISTORY: Lobulated enhancing lesion in the fourth ventricle.     COMPARISON: 1/4/21 brain MRI.     TECHNIQUE: Sagittal T2 FSE and FSEIR, axial T2, sagittal T1 without contrast,  and post gadolinium sagittal and axial T1 MRI images through the thoracic spine. Patient received 15 mL Dotarem gadolinium contrast IV.     FINDINGS:   Normal alignment of the thoracic spine.   Vertebral body heights are normal. No findings of acute or chronic fracture. Normal marrow signal in the thoracic spine. Thoracic disc heights are maintained throughout. Normal signal in the disc  spaces. Thoracic spinal cord normal signal and normal morphology. Postcontrast no  abnormal enhancement of the thoracic spinal cord, no masses. No pathologic enhancement of the thoracic vertebral bodies, disc spaces, or  epidural spaces. Conus medullaris terminates at the L1 level with normal signal.     Axial and sagittal imaging through the thoracic disc spaces shows no focal disc  pathology. No central canal or foraminal stenosis.     Prevertebral and paraspinous soft tissues included on this exam are remarkable  for a subcentimeter cystic-appearing focus in the right hepatic lobe, may be  incidental hepatic cyst.     IMPRESSION  IMPRESSION:     1. Normal appearance of the thoracic spinal cord. No abnormal signal or mass  lesion. 2. Normal thoracic disc spaces. No central spinal canal or foraminal stenosis at  any thoracic level. Results  MRI [facility] SPINE W WO CONT (Accession [id number]) (Order [id number])  Allergies     No Known Allergies   Exam Information    Status Exam Begun  Exam Ended    Final [99] 1/05/2021 07:41 1/05/2021  9:53 AM [id number]  9:53 AM   Result Information    Status: Final result (Exam End: 1/5/2021 09:53) Provider Status: Open   Study Result    MRI Thoracic With And Without Contrast     CPT CODE: 04193     PROVIDED REASON FOR EXAM: CNS mass. HISTORY: Lobulated enhancing lesion in the fourth ventricle.     COMPARISON: 1/4/21 brain MRI.     TECHNIQUE: Sagittal T2 FSE and FSEIR, axial T2, sagittal T1 without contrast,  and post gadolinium sagittal and axial T1 MRI images through the thoracic spine. Patient received 15 mL Dotarem gadolinium contrast IV.     FINDINGS:   Normal alignment of the thoracic spine. Vertebral body heights are normal. No findings of acute or chronic fracture. Normal marrow signal in the thoracic spine.   Thoracic disc heights are maintained throughout. Normal signal in the disc  spaces. Thoracic spinal cord normal signal and normal morphology. Postcontrast no  abnormal enhancement of the thoracic spinal cord, no masses. No pathologic enhancement of the thoracic vertebral bodies, disc spaces, or  epidural spaces. Conus medullaris terminates at the L1 level with normal signal.     Axial and sagittal imaging through the thoracic disc spaces shows no focal disc  pathology. No central canal or foraminal stenosis.     Prevertebral and paraspinous soft tissues included on this exam are remarkable  for a subcentimeter cystic-appearing focus in the right hepatic lobe, may be  incidental hepatic cyst.     IMPRESSION  IMPRESSION:     1. Normal appearance of the thoracic spinal cord. No abnormal signal or mass  lesion. 2. Normal thoracic disc spaces. No central spinal canal or foraminal stenosis at  any thoracic level. Results  MRI BRAIN W WO CONT (Accession [id number]) (Order [id number])  Allergies     No Known Allergies   Exam Information    Status Exam Begun  Exam Ended    Final [99] 1/04/2021 12:34 1/04/2021  1:13 PM [id number]  1:13 PM   Result Information    Status: Final result (Exam End: 1/4/2021 13:13) Provider Status: Open   Study Result    EXAM: MRI BRAIN W WO CONT     CLINICAL INDICATION/HISTORY: \"Rule out stroke\"  -Hypertension, mild headache.     COMPARISON: CTA head and neck from earlier the same day.     TECHNIQUE: MR imaging was performed through the brain before and after  uneventful administration of 15 mL of MultiHance intravenously.      FINDINGS:     Again noted is a coarsely calcified, 1 x 1.8 x 1.7 cm (AP, TV, SI) lobulated  mass centered in the fourth ventricle. The mass is mildly enhancing.      There are 2 adjacent thin parafalcine meningiomas (axial postcontrast 22, 24;  coronal 11, 13, respectively).      There is possible, soft tissue fullness in the right jugular foramen on limited  detail thick-slice assessment (axial T2 image 6).    There is a punctate focus of T2/FLAIR hyperintensity in subcortical white matter  of right frontal lobe (axial FLAIR 15). There is a possible punctate focus of  enhancement at the fundus of the right internal auditory canal (coronal  postcontrast, 17, axial 7).     No acute intracranial hemorrhage, acute infarct, or hydrocephalus is present. Incidental note is made of partially empty sella.     IMPRESSION  IMPRESSION:     1. There is a coarsely calcified, lobulated 1.8 cm mass in the fourth ventricle. 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 Subependymoma is less likely in this  demographic, but could appear similar on imaging. Pilocytic astrocytoma,  hemangioblastoma are possible but would be more likely to have a large cystic  component. Metastasis or choroid plexus papilloma remain less likely  considerations.     2. Concomitant presence of at least 2 thin parafalcine meningiomas raises  suspicion for underlying genetic abnormality such as neurofibromatosis type II,  especially if the above represents ependymoma, though not necessarily.     3. Possible subcentimeter lesions at right jugular foramen and internal auditory  canal, incompletely characterized due to thick slab acquisition. The presence of  these lesions would corroborate the possibility of neurofibromatosis type II. Postcontrast MPRAGE, IAC sequences are recommended at follow-up.      4. No hydrocephalus or associated mass effect. No cerebral edema or infarct.     5.  Incidental note is made of partially empty sella, nonspecific, but sometimes  seen in the setting of elevated intracranial pressure.     The above findings were discussed with Dianna LEES, via telephone by Dr. Gómez Arshad at [phone] on 1/4/2021.                 Pertinent Lab Data:  Recent Labs 21  1242 21  1107   WBC 9.1 8.5   HGB 13.0 11.8*   HCT 39.6 36.4    285     Recent Labs     21  1107      K 3.8      CO2 29   GLU 93   BUN 10   CREA 0.77   CA 9.5   INR 1.1       DISCHARGE MEDICATIONS:   @  Current Discharge Medication List      START taking these medications    Details   aspirin 81 mg chewable tablet Take 1 Tab by mouth daily. Qty: 30 Tab, Refills: 0      famotidine (PEPCID) 20 mg tablet Take 1 Tab by mouth every evening. Qty: 30 Tab, Refills: 0      potassium chloride SR (K-TAB) 20 mEq tablet Take 1 Tab by mouth daily. Qty: 30 Tab, Refills: 0      amLODIPine (Norvasc) 5 mg tablet Take 1 Tab by mouth daily. Qty: 30 Tab, Refills: 0      hydroCHLOROthiazide (HYDRODIURIL) 12.5 mg tablet Take 1 Tab by mouth daily. Qty: 30 Tab, Refills: 0         CONTINUE these medications which have NOT CHANGED    Details   prenatal vit-iron fumarate-fa 27 mg iron- 0.8 mg tab tablet Take 1 Tab by mouth daily. ibuprofen (MOTRIN) 600 mg tablet Take 1 Tab by mouth every six (6) hours as needed for Pain. Qty: 40 Tab, Refills: 1         STOP taking these medications       oxyCODONE-acetaminophen (PERCOCET) 5-325 mg per tablet Comments:   Reason for Stopping:         hydroCHLOROthiazide (MICROZIDE) 12.5 mg capsule Comments:   Reason for Stopping:         NIFEdipine ER (PROCARDIA XL) 30 mg ER tablet Comments:   Reason for Stopping:                 My Recommended Diet, Activity, Wound Care, and follow-up labs are listed in the patient's Discharge Insturctions which I have personally completed and reviewed. Disposition:     [x] Home with family     [] [facility] [place] PT/RN   [] SNF/NH   [] Inpatient Rehab/[facility]  Condition at Discharge:  Stable    Follow up with:   PCP : Rossy Snyder MD      Please follow-up tests/labs that are still pendin.  None  2.    >30 minutes spent coordinating this discharge (review instructions/follow-up, prescriptions, preparing report for sign off)    Disclaimer: Sections of this note are dictated utilizing voice recognition software, which may have resulted in some phonetic based errors in grammar and contents. Even though attempts were made to correct all the mistakes, some may have been missed, and remained in the body of the document. If questions arise, please contact our department.     Signed:  Catherine Layton MD  1/5/2021  4:08 PM

## 2021-01-05 NOTE — PROGRESS NOTES
Problem: Self Care Deficits Care Plan (Adult)  Goal: *Acute Goals and Plan of Care (Insert Text)  Outcome: Resolved/Met   OCCUPATIONAL THERAPY EVALUATION/DISCHARGE    Patient: Susanne Patel (89 y.o. female)  Date: 2021  Primary Diagnosis: Facial tingling sensation [R20.2]  Numbness and tingling in left hand [R20.0, R20.2]  Left facial numbness [R20.0]        Precautions:      PLOF: Pt reports he was (I) with basic self-care/ADLs, IADLs, and functional mobility without AD PTA. Pt lives with her children (ages 16, 10, and 3 months old) in an apartment on the 2nd floor. ASSESSMENT AND RECOMMENDATIONS:  Based on the objective data described below, the patient presents she is (I) with basic self-care/ADLs. BUE AROM/strength WFL. Pt doff/dons R sock without difficulty. Pt performs toilet transfers without AD, no LOB noted. Pt reports LUE abnormal sensation has resolved, however still has RUE numbness. Pt given blue sponge to increase neuro feedback and  strength of RUE. Recommended to pt on outpatient OT services if abnormal RUE sensation persists. Pt denies self-care concerns at this time. As pt presents she is at her baseline, skilled occupational therapy is not indicated at this time, therefore acute OT to d/c pt from caseload.     Discharge Recommendations: Outpatient OT  Further Equipment Recommendations for Discharge: N/A     SUBJECTIVE:   Patient agreeable to OT eval.    OBJECTIVE DATA SUMMARY:     Past Medical History:   Diagnosis Date    Dental caries     Hypertension     Insect bite     Migraine     Sore throat     Strep pharyngitis     Tooth ache      Past Surgical History:   Procedure Laterality Date    HX  SECTION       Barriers to Learning/Limitations: None  Compensate with: visual, verbal, tactile, kinesthetic cues/model    Home Situation:   Home Situation  Home Environment: Apartment  One/Two Story Residence: One story  Living Alone: No  Support Systems: Family member(s)  Patient Expects to be Discharged to[de-identified] Apartment  Current DME Used/Available at Home: None  []     Right hand dominant   []     Left hand dominant    Cognitive/Behavioral Status:  Neurologic State: Alert  Orientation Level: Oriented X4  Cognition: Follows commands  Safety/Judgement: Fall prevention    Skin: Visible skin appeared intact  Edema: None noted    Vision/Perceptual:    Pt able to read the clock without difficulty, without glasses      Coordination: BUE  Coordination: Within functional limits  Fine Motor Skills-Upper: Left Intact; Right Intact    Gross Motor Skills-Upper: Left Intact; Right Intact    Balance:  Sitting: Intact  Standing: Intact; Without support    Strength: BUE  Strength: Within functional limits    Tone & Sensation: BUE  Tone: Normal  Sensation: Impaired(Pt reports numbness in RUE; Pt denies abnormal sensation on LUE and states numbness has resolved)    Range of Motion: BUE  AROM: Within functional limits      Functional Mobility and Transfers for ADLs:  Bed Mobility:  Pt sitting EOB upon arrival  Transfers:  Sit to Stand: Independent  Stand to Sit: Independent   Toilet Transfer : Independent       ADL Assessment:  Feeding: Independent    Oral Facial Hygiene/Grooming: Independent    Bathing: Independent    Upper Body Dressing: Independent    Lower Body Dressing: Independent    Toileting: Independent      ADL Intervention:  Lower Body Dressing Assistance  Dressing Assistance: Independent  Socks: Independent  Leg Crossed Method Used: Yes  Position Performed: Seated edge of bed    Cognitive Retraining  Safety/Judgement: Fall prevention    Pain:  Pain level pre-treatment: 0/10   Pain level post-treatment: 0/10   Pain Intervention(s): Medication (see MAR); Rest, Ice, Repositioning  Response to intervention: Nurse notified, See doc flow    Activity Tolerance:   Good    Please refer to the flowsheet for vital signs taken during this treatment.   After treatment:   []  Patient left in no apparent distress sitting up in chair  [x]  Patient left in no apparent distress in bed  [x]  Call bell left within reach  [x]  Nursing notified  []  Caregiver present  []  Bed alarm activated    COMMUNICATION/EDUCATION:   [x]      Role of Occupational Therapy in the acute care setting  [x]      Home safety education was provided and the patient/caregiver indicated understanding. [x]      Patient/family have participated as able and agree with findings and recommendations. []      Patient is unable to participate in plan of care at this time. Thank you for this referral.  Galina Todd MS, OTR/L  Time Calculation: 9 mins      Eval Complexity: History: MEDIUM Complexity : Expanded review of history including physical, cognitive and psychosocial  history ; Examination: LOW Complexity : 1-3 performance deficits relating to physical, cognitive , or psychosocial skils that result in activity limitations and / or participation restrictions ;    Decision Making:LOW Complexity : No comorbidities that affect functional and no verbal or physical assistance needed to complete eval tasks

## 2021-01-05 NOTE — PROGRESS NOTES
Problem: Dysphagia (Adult)  Goal: *Acute Goals and Plan of Care (Insert Text)  Description:   Patient will:  1. Tolerate PO trials with 0 s/s overt distress in 4/5 trials - met    Rec:     Reg solid with thin liquids  Oral care TID  Meds per pt preference  Outcome: Resolved/Met   [address] EVALUATION AND DISCHARGE    Patient: Jocelin Baxter (49 y.o. female)  Date: 2021  Primary Diagnosis: Facial tingling sensation [R20.2]  Numbness and tingling in left hand [R20.0, R20.2]  Left facial numbness [R20.0]        Precautions: n/a     PLOF: As per H&P    ASSESSMENT :  Based on the objective data described below, the patient presents with oropharyngeal swallow fxn essentially WFL. Strength, ROM, and coordination of the orofacial musculature were all found to [facility] Pt tolerated reg solid, puree, and thin liquids +/- straw consecutive swallows without any overt s/sx of aspiration. Mastication was appropriate with timely a-p transit. Positive oral clearance observed across all trials. Pt safe for initiation of reg solid diet with thin liquids, aspiration precautions, oral care TID, and meds as tolerated. No further skilled SLP services are indicated at this time. Please re-consult if needed. PLAN :  Recommendations and Planned Interventions:  No formal ST needs ID'd for dysphagia. Eval only. Discharge Recommendations: None     SUBJECTIVE:   Patient stated I don't want to do any of this. I'm fine. When can I go home? .     OBJECTIVE:     Past Medical History:   Diagnosis Date    Dental caries     Hypertension     Insect bite     Migraine     Sore throat     Strep pharyngitis     Tooth ache      Past Surgical History:   Procedure Laterality Date    HX  SECTION       Prior Level of Function/Home Situation: see below  Home Situation  Home Environment: Apartment  One/Two Story Residence: One story  Living Alone: No  Support Systems: Family member(s)  Patient Expects to be Discharged to[de-identified] Apartment  Current DME Used/Available at Home: None  Diet prior to admission: regular solid with thin  Current Diet:  regular solid with thin    Cognitive and Communication Status:  Neurologic State: Alert  Orientation Level: Oriented X4  Cognition: Follows commands  Oral Assessment:  Oral Assessment  Labial: No impairment  Dentition: Natural;Intact  Oral Hygiene: adequate  Lingual: No impairment  Velum: No impairment  Mandible: No impairment  P.O. Trials:  Patient Position: 90 at side of bed  Vocal quality prior to P.O.: No impairment  Consistency Presented: Thin liquid; Solid  How Presented: Self-fed/presented;Cup/sip;Spoon;Straw  Bolus Acceptance: No impairment  Bolus Formation/Control: No impairment  Propulsion: No impairment  Oral Residue: None  Initiation of Swallow: No impairment  Laryngeal Elevation: Functional  Aspiration Signs/Symptoms: None  Pharyngeal Phase Characteristics: No impairment, issues, or problems   Effective Modifications: None  Cues for Modifications: None     Oral Phase Severity: No impairment  Pharyngeal Phase Severity : No impairment    PAIN:  Start of Eval: 0  End of Eval: 0     After evaluation:   []            Patient left in no apparent distress sitting up in chair  [x]            Patient left in no apparent distress in bed  [x]            Call bell left within reach  [x]            Nursing notified  []            Family present  []            Caregiver present  []            Bed alarm activated      COMMUNICATION/EDUCATION:   [x]            Aspiration precautions; swallow safety; compensatory techniques. [x]            Patient/family have participated as able in goal setting and plan of care. []            Patient/family agree to work toward stated goals and plan of care. []            Patient understands intent and goals of therapy; neutral about participation.   []            Patient unable to participate in goal setting/plan of care; educ ongoing with interdisciplinary staff  [x]         Posted safety precautions in patient's room.     Thank you for this referral.    Kendra Teague M.S. CCC-SLP/L  Speech-Language Pathologist

## 2021-01-05 NOTE — PROGRESS NOTES
D/C order noted for today. Orders reviewed. Patient said she will drive herself home at time of discharge today. No needs identified at this time. CM remains available if needed.        Mia West -[phone]

## 2021-01-19 ENCOUNTER — APPOINTMENT (OUTPATIENT)
Dept: GENERAL RADIOLOGY | Age: 36
End: 2021-01-19
Attending: EMERGENCY MEDICINE
Payer: MEDICAID

## 2021-01-19 ENCOUNTER — HOSPITAL ENCOUNTER (EMERGENCY)
Age: 36
Discharge: HOME | End: 2021-01-19
Attending: EMERGENCY MEDICINE
Payer: MEDICAID

## 2021-01-19 VITALS
BODY MASS INDEX: 34.17 KG/M2 | SYSTOLIC BLOOD PRESSURE: 123 MMHG | HEART RATE: 87 BPM | TEMPERATURE: 98.7 F | RESPIRATION RATE: 16 BRPM | HEIGHT: 61 IN | WEIGHT: 181 LBS | DIASTOLIC BLOOD PRESSURE: 88 MMHG | OXYGEN SATURATION: 98 %

## 2021-01-19 DIAGNOSIS — R05.9 COUGH: Primary | ICD-10-CM

## 2021-01-19 LAB
ANION GAP SERPL CALC-SCNC: 4 MMOL/L (ref 3–18)
ATRIAL RATE: 89 BPM
BASOPHILS # BLD: 0 K/UL (ref 0–0.1)
BASOPHILS NFR BLD: 0 % (ref 0–2)
BUN SERPL-MCNC: 10 MG/DL (ref 7–18)
BUN/CREAT SERPL: 12 (ref 12–20)
CALCIUM SERPL-MCNC: 9.6 MG/DL (ref 8.5–10.1)
CALCULATED P AXIS, ECG09: 64 DEGREES
CALCULATED R AXIS, ECG10: 2 DEGREES
CALCULATED T AXIS, ECG11: 64 DEGREES
CHLORIDE SERPL-SCNC: 107 MMOL/L (ref 100–111)
CO2 SERPL-SCNC: 29 MMOL/L (ref 21–32)
CREAT SERPL-MCNC: 0.84 MG/DL (ref 0.6–1.3)
DIAGNOSIS, 93000: NORMAL
DIFFERENTIAL METHOD BLD: ABNORMAL
EOSINOPHIL # BLD: 0.5 K/UL (ref 0–0.4)
EOSINOPHIL NFR BLD: 5 % (ref 0–5)
ERYTHROCYTE [DISTWIDTH] IN BLOOD BY AUTOMATED COUNT: 15.8 % (ref 11.6–14.5)
GLUCOSE SERPL-MCNC: 114 MG/DL (ref 74–99)
HCG SERPL QL: NEGATIVE
HCT VFR BLD AUTO: 39.5 % (ref 35–45)
HGB BLD-MCNC: 13.3 G/DL (ref 12–16)
INR PPP: 1.1 (ref 0.8–1.2)
LYMPHOCYTES # BLD: 1.9 K/UL (ref 0.9–3.6)
LYMPHOCYTES NFR BLD: 19 % (ref 21–52)
MCH RBC QN AUTO: 28.3 PG (ref 24–34)
MCHC RBC AUTO-ENTMCNC: 33.7 G/DL (ref 31–37)
MCV RBC AUTO: 84 FL (ref 74–97)
MONOCYTES # BLD: 0.8 K/UL (ref 0.05–1.2)
MONOCYTES NFR BLD: 8 % (ref 3–10)
NEUTS SEG # BLD: 6.7 K/UL (ref 1.8–8)
NEUTS SEG NFR BLD: 68 % (ref 40–73)
P-R INTERVAL, ECG05: 156 MS
PLATELET # BLD AUTO: 276 K/UL (ref 135–420)
PMV BLD AUTO: 10.6 FL (ref 9.2–11.8)
POTASSIUM SERPL-SCNC: 3.8 MMOL/L (ref 3.5–5.5)
PROTHROMBIN TIME: 13.6 SEC (ref 11.5–15.2)
Q-T INTERVAL, ECG07: 328 MS
QRS DURATION, ECG06: 80 MS
QTC CALCULATION (BEZET), ECG08: 399 MS
RBC # BLD AUTO: 4.7 M/UL (ref 4.2–5.3)
SODIUM SERPL-SCNC: 140 MMOL/L (ref 136–145)
TROPONIN I SERPL-MCNC: <0.02 NG/ML (ref 0–0.04)
VENTRICULAR RATE, ECG03: 89 BPM
WBC # BLD AUTO: 9.9 K/UL (ref 4.6–13.2)

## 2021-01-19 PROCEDURE — 80048 BASIC METABOLIC PNL TOTAL CA: CPT

## 2021-01-19 PROCEDURE — 85025 COMPLETE CBC W/AUTO DIFF WBC: CPT

## 2021-01-19 PROCEDURE — 85610 PROTHROMBIN TIME: CPT

## 2021-01-19 PROCEDURE — 84484 ASSAY OF TROPONIN QUANT: CPT

## 2021-01-19 PROCEDURE — 71045 X-RAY EXAM CHEST 1 VIEW: CPT

## 2021-01-19 PROCEDURE — 99282 EMERGENCY DEPT VISIT SF MDM: CPT

## 2021-01-19 PROCEDURE — 84703 CHORIONIC GONADOTROPIN ASSAY: CPT

## 2021-01-19 PROCEDURE — 93005 ELECTROCARDIOGRAM TRACING: CPT

## 2021-01-19 RX ORDER — BENZONATATE 100 MG/1
100 CAPSULE ORAL
Qty: 21 CAP | Refills: 0 | Status: SHIPPED | OUTPATIENT
Start: 2021-01-19 | End: 2021-01-26

## 2021-01-19 NOTE — ED TRIAGE NOTES
Pt complaining of nasal congestion and phlegm in her throat after waking up. Reports she spit up some blood. Reports it is intermittent and has been going on for approx 1 month.

## 2021-01-19 NOTE — DISCHARGE INSTRUCTIONS
Take medication as prescribed. Follow-up with your primary care physician within 2 days for reassessment. Bring the results from this visit with you for their review. Return to the ED immediately for any new, worsening, or persistent symptoms, including fever, chest pain, or any other medical concerns.

## 2021-01-19 NOTE — ED PROVIDER NOTES
EMERGENCY DEPARTMENT HISTORY AND PHYSICAL EXAM    1:12 PM      Date: 1/19/2021  Patient Name: José Antonio Li    History of Presenting Illness     Chief Complaint   Patient presents with    Nasal Congestion    Generalized Body Aches         History Provided By: Patient    Additional History (Context): José Antonio Li is a 30 y.o. female with hx of HTN, HLD, and other noted PMH who presents with complaint of nasal congestion and intermittent cough x1 month. Patient notes clear phlegm with specks of blood this morning. Patient notes \"it happened when I tried to clear my throat\". Patient denies fever or chills, chest pain, shortness of breath, abdominal pain, nausea or vomiting, melena, bright red blood per rectum, hematochezia. Denies history of cardiac disease, history of DVT or PE,  recent surgery or travel, leg swelling. Denies blood thinner use. Denies sick contacts, known exposure to evOLED. Pt notes she quit smoking 7 years ago. Pt has a photo of blood, small specks present in sputum. PCP: Dmitri Hernandez MD    Current Outpatient Medications   Medication Sig Dispense Refill    benzonatate (Tessalon Perles) 100 mg capsule Take 1 Cap by mouth three (3) times daily as needed for Cough for up to 7 days. 21 Cap 0    aspirin 81 mg chewable tablet Take 1 Tab by mouth daily. 30 Tab 0    famotidine (PEPCID) 20 mg tablet Take 1 Tab by mouth every evening. 30 Tab 0    potassium chloride SR (K-TAB) 20 mEq tablet Take 1 Tab by mouth daily. 30 Tab 0    amLODIPine (Norvasc) 5 mg tablet Take 1 Tab by mouth daily. 30 Tab 0    hydroCHLOROthiazide (HYDRODIURIL) 12.5 mg tablet Take 1 Tab by mouth daily. 30 Tab 0    prenatal vit-iron fumarate-fa 27 mg iron- 0.8 mg tab tablet Take 1 Tab by mouth daily.          Past History     Past Medical History:  Past Medical History:   Diagnosis Date    Dental caries     Hypertension     Insect bite     Migraine     Sore throat     Strep pharyngitis     Tooth ache        Past Surgical History:  Past Surgical History:   Procedure Laterality Date    HX  SECTION         Family History:  Family History   Problem Relation Age of Onset    Cancer Other     Diabetes Other     Hypertension Other        Social History:  Social History     Tobacco Use    Smoking status: Former Smoker    Smokeless tobacco: Never Used   Substance Use Topics    Alcohol use: No    Drug use: No       Allergies:  No Known Allergies      Review of Systems       Review of Systems   Constitutional: Negative for chills and fever. HENT: Positive for sinus pressure. Respiratory: Positive for cough. Negative for shortness of breath. Cardiovascular: Negative for chest pain. Gastrointestinal: Negative for abdominal pain, nausea and vomiting. Skin: Negative for rash. Neurological: Negative for weakness. All other systems reviewed and are negative. Physical Exam     Visit Vitals  /88 (BP 1 Location: Left arm, BP Patient Position: At rest;Sitting)   Pulse 87   Temp 98.7 °F (37.1 °C)   Resp 16   Ht 5' 1\" (1.549 m)   Wt 82.1 kg (181 lb)   LMP 2020   SpO2 98%   BMI 34.20 kg/m²         Physical Exam  Vitals signs and nursing note reviewed. Constitutional:       General: She is not in acute distress. Appearance: Normal appearance. She is well-developed. She is not ill-appearing, toxic-appearing or diaphoretic. HENT:      Head: Normocephalic and atraumatic. Nose: Nose normal.      Mouth/Throat:      Mouth: Mucous membranes are moist.   Neck:      Musculoskeletal: Normal range of motion and neck supple. No neck rigidity. Cardiovascular:      Rate and Rhythm: Normal rate and regular rhythm. Heart sounds: Normal heart sounds. No murmur. No friction rub. No gallop. Pulmonary:      Effort: Pulmonary effort is normal. No respiratory distress. Breath sounds: Normal breath sounds. No wheezing or rales. Musculoskeletal: Normal range of motion. Lymphadenopathy:      Cervical: No cervical adenopathy. Skin:     General: Skin is warm. Findings: No rash. Neurological:      Mental Status: She is alert. Diagnostic Study Results     Labs -  Recent Results (from the past 12 hour(s))   EKG, 12 LEAD, INITIAL    Collection Time: 01/19/21 12:22 PM   Result Value Ref Range    Ventricular Rate 89 BPM    Atrial Rate 89 BPM    P-R Interval 156 ms    QRS Duration 80 ms    Q-T Interval 328 ms    QTC Calculation (Bezet) 399 ms    Calculated P Axis 64 degrees    Calculated R Axis 2 degrees    Calculated T Axis 64 degrees    Diagnosis       Normal sinus rhythm  Biatrial enlargement  Cannot rule out Anterior infarct , age undetermined  Abnormal ECG  When compared with ECG of 05-JAN-2021 07:18,  Non-specific change in ST segment in Anterior leads  Nonspecific T wave abnormality now evident in Lateral leads     CBC WITH AUTOMATED DIFF    Collection Time: 01/19/21 12:34 PM   Result Value Ref Range    WBC 9.9 4.6 - 13.2 K/uL    RBC 4.70 4.20 - 5.30 M/uL    HGB 13.3 12.0 - 16.0 g/dL    HCT 39.5 35.0 - 45.0 %    MCV 84.0 74.0 - 97.0 FL    MCH 28.3 24.0 - 34.0 PG    MCHC 33.7 31.0 - 37.0 g/dL    RDW 15.8 (H) 11.6 - 14.5 %    PLATELET 161 330 - 899 K/uL    MPV 10.6 9.2 - 11.8 FL    NEUTROPHILS 68 40 - 73 %    LYMPHOCYTES 19 (L) 21 - 52 %    MONOCYTES 8 3 - 10 %    EOSINOPHILS 5 0 - 5 %    BASOPHILS 0 0 - 2 %    ABS. NEUTROPHILS 6.7 1.8 - 8.0 K/UL    ABS. LYMPHOCYTES 1.9 0.9 - 3.6 K/UL    ABS. MONOCYTES 0.8 0.05 - 1.2 K/UL    ABS. EOSINOPHILS 0.5 (H) 0.0 - 0.4 K/UL    ABS.  BASOPHILS 0.0 0.0 - 0.1 K/UL    DF AUTOMATED     METABOLIC PANEL, BASIC    Collection Time: 01/19/21 12:34 PM   Result Value Ref Range    Sodium 140 136 - 145 mmol/L    Potassium 3.8 3.5 - 5.5 mmol/L    Chloride 107 100 - 111 mmol/L    CO2 29 21 - 32 mmol/L    Anion gap 4 3.0 - 18 mmol/L    Glucose 114 (H) 74 - 99 mg/dL    BUN 10 7.0 - 18 MG/DL    Creatinine 0.84 0.6 - 1.3 MG/DL    BUN/Creatinine ratio 12 12 - 20      GFR est AA >60 >60 ml/min/1.73m2    GFR est non-AA >60 >60 ml/min/1.73m2    Calcium 9.6 8.5 - 10.1 MG/DL   PROTHROMBIN TIME + INR    Collection Time: 01/19/21 12:34 PM   Result Value Ref Range    Prothrombin time 13.6 11.5 - 15.2 sec    INR 1.1 0.8 - 1.2     TROPONIN I    Collection Time: 01/19/21 12:34 PM   Result Value Ref Range    Troponin-I, QT <0.02 0.0 - 0.045 NG/ML   HCG QL SERUM    Collection Time: 01/19/21 12:34 PM   Result Value Ref Range    HCG, Ql. Negative NEG         Radiologic Studies -   XR CHEST SNGL V   Final Result   No radiographic evidence of acute cardiopulmonary process. Medical Decision Making   I am the first provider for this patient. I reviewed the vital signs, available nursing notes, past medical history, past surgical history, family history and social history. Vital Signs-Reviewed the patient's vital signs. Pulse Oximetry Analysis -  98% on room air     Cardiac Monitor:  Rate: 80  Rhythm:  NSR    EKG: Interpreted by the EP. Time Interpreted: 1230   Rate: 89   Rhythm: normal sinus rhythm, LAD, T wave flattening V3-V6   Interpretation: no evidence of acute ischemia   Comparison: 11/30/20    Records Reviewed: Nursing Notes, Old Medical Records and Previous electrocardiograms (Time of Review: 1:12 PM)    ED Course: Progress Notes, Reevaluation, and Consults:  2:29 PM Reviewed results with patient. Discussed need for close outpatient follow-up. Notes she has a visit tomorrow scheduled with her PMD. Discussed strict return precautions, including fever, chest pain, or any other medical concerns. Pt in agreement with plan, ready to go home. Provider Notes (Medical Decision Making): 56-year-old female who presents to the ED due to nasal congestion and intermittent cough x1 month. Patient notes specks of blood in her sputum this morning. Afebrile, nontoxic-appearing, looks well, 98% on room air.   EKG without evidence of ischemia, labs essentially unremarkable. Chest x-ray without acute process. No evidence of tachycardia, tachypnea, hypoxia. Do not feel further labs or imaging are warranted. Do not suspect ACS, PE, or aortic pathology. Patient has follow-up scheduled with PMD tomorrow. Discussed importance of close follow-up, strict return precautions for any new or worsening symptoms. Diagnosis     Clinical Impression:   1. Cough        Disposition: home     Follow-up Information     Follow up With Specialties Details Why [address] [facility]    [facility] EMERGENCY DEPT Emergency Medicine  If symptoms worsen [address] [facility] [address]  [phone]    Adelina Kowalski MD Internal Medicine Schedule an appointment as soon as possible for a visit   [address] [place], [place] [id number] [phone]             Patient's Medications   Start Taking    BENZONATATE (TESSALON PERLES) 100 MG CAPSULE    Take 1 Cap by mouth three (3) times daily as needed for Cough for up to 7 days. Continue Taking    AMLODIPINE (NORVASC) 5 MG TABLET    Take 1 Tab by mouth daily. ASPIRIN 81 MG CHEWABLE TABLET    Take 1 Tab by mouth daily. FAMOTIDINE (PEPCID) 20 MG TABLET    Take 1 Tab by mouth every evening. HYDROCHLOROTHIAZIDE (HYDRODIURIL) 12.5 MG TABLET    Take 1 Tab by mouth daily. POTASSIUM CHLORIDE SR (K-TAB) 20 MEQ TABLET    Take 1 Tab by mouth daily. PRENATAL VIT-IRON FUMARATE-FA 27 MG IRON- 0.8 MG TAB TABLET    Take 1 Tab by mouth daily. These Medications have changed    No medications on file   Stop Taking    IBUPROFEN (MOTRIN) 600 MG TABLET    Take 1 Tab by mouth every six (6) hours as needed for Pain. Dictation disclaimer:  Please note that this dictation was completed with Vi[employer], the [employer] voice recognition software. Quite often unanticipated grammatical, syntax, homophones, and other interpretive errors are inadvertently transcribed by the computer software. Please disregard these errors.   Please excuse any errors that have escaped final proofreading.

## 2021-02-24 ENCOUNTER — TRANSCRIBE ORDER (OUTPATIENT)
Dept: SCHEDULING | Age: 36
End: 2021-02-24

## 2021-02-24 DIAGNOSIS — R94.6 NONSPECIFIC ABNORMAL RESULTS OF THYROID FUNCTION STUDY: Primary | ICD-10-CM

## 2021-02-25 ENCOUNTER — HOSPITAL ENCOUNTER (OUTPATIENT)
Dept: ULTRASOUND IMAGING | Age: 36
Discharge: HOME | End: 2021-02-25
Attending: NURSE PRACTITIONER
Payer: MEDICAID

## 2021-02-25 DIAGNOSIS — R94.6 NONSPECIFIC ABNORMAL RESULTS OF THYROID FUNCTION STUDY: ICD-10-CM

## 2021-02-25 PROCEDURE — 76536 US EXAM OF HEAD AND NECK: CPT

## 2021-03-01 ENCOUNTER — HOSPITAL ENCOUNTER (EMERGENCY)
Age: 36
Discharge: HOME | End: 2021-03-01
Attending: EMERGENCY MEDICINE
Payer: MEDICAID

## 2021-03-01 VITALS
OXYGEN SATURATION: 100 % | RESPIRATION RATE: 14 BRPM | TEMPERATURE: 98.2 F | HEART RATE: 78 BPM | DIASTOLIC BLOOD PRESSURE: 97 MMHG | SYSTOLIC BLOOD PRESSURE: 144 MMHG | BODY MASS INDEX: 34.2 KG/M2 | WEIGHT: 181 LBS

## 2021-03-01 DIAGNOSIS — R53.83 FATIGUE, UNSPECIFIED TYPE: Primary | ICD-10-CM

## 2021-03-01 LAB
ALBUMIN SERPL-MCNC: 3.7 G/DL (ref 3.4–5)
ALBUMIN/GLOB SERPL: 0.9 {RATIO} (ref 0.8–1.7)
ALP SERPL-CCNC: 80 U/L (ref 45–117)
ALT SERPL-CCNC: 43 U/L (ref 13–56)
ANION GAP SERPL CALC-SCNC: 8 MMOL/L (ref 3–18)
AST SERPL-CCNC: 25 U/L (ref 10–38)
BASOPHILS # BLD: 0.1 K/UL (ref 0–0.1)
BASOPHILS NFR BLD: 1 % (ref 0–2)
BILIRUB SERPL-MCNC: 0.2 MG/DL (ref 0.2–1)
BUN SERPL-MCNC: 17 MG/DL (ref 7–18)
BUN/CREAT SERPL: 24 (ref 12–20)
CALCIUM SERPL-MCNC: 9.1 MG/DL (ref 8.5–10.1)
CHLORIDE SERPL-SCNC: 108 MMOL/L (ref 100–111)
CO2 SERPL-SCNC: 26 MMOL/L (ref 21–32)
CREAT SERPL-MCNC: 0.72 MG/DL (ref 0.6–1.3)
DIFFERENTIAL METHOD BLD: ABNORMAL
EOSINOPHIL # BLD: 0.9 K/UL (ref 0–0.4)
EOSINOPHIL NFR BLD: 9 % (ref 0–5)
ERYTHROCYTE [DISTWIDTH] IN BLOOD BY AUTOMATED COUNT: 14.9 % (ref 11.6–14.5)
GLOBULIN SER CALC-MCNC: 4 G/DL (ref 2–4)
GLUCOSE SERPL-MCNC: 86 MG/DL (ref 74–99)
HCG SERPL QL: NEGATIVE
HCT VFR BLD AUTO: 35.8 % (ref 35–45)
HGB BLD-MCNC: 12 G/DL (ref 12–16)
LYMPHOCYTES # BLD: 3.4 K/UL (ref 0.9–3.6)
LYMPHOCYTES NFR BLD: 35 % (ref 21–52)
MAGNESIUM SERPL-MCNC: 2.1 MG/DL (ref 1.6–2.6)
MCH RBC QN AUTO: 29 PG (ref 24–34)
MCHC RBC AUTO-ENTMCNC: 33.5 G/DL (ref 31–37)
MCV RBC AUTO: 86.5 FL (ref 74–97)
MONOCYTES # BLD: 0.7 K/UL (ref 0.05–1.2)
MONOCYTES NFR BLD: 7 % (ref 3–10)
NEUTS SEG # BLD: 4.7 K/UL (ref 1.8–8)
NEUTS SEG NFR BLD: 48 % (ref 40–73)
PLATELET # BLD AUTO: 262 K/UL (ref 135–420)
PMV BLD AUTO: 10.3 FL (ref 9.2–11.8)
POTASSIUM SERPL-SCNC: 3.8 MMOL/L (ref 3.5–5.5)
PROT SERPL-MCNC: 7.7 G/DL (ref 6.4–8.2)
RBC # BLD AUTO: 4.14 M/UL (ref 4.2–5.3)
SODIUM SERPL-SCNC: 142 MMOL/L (ref 136–145)
TSH SERPL DL<=0.05 MIU/L-ACNC: 0.63 UIU/ML (ref 0.36–3.74)
WBC # BLD AUTO: 9.8 K/UL (ref 4.6–13.2)

## 2021-03-01 PROCEDURE — 83735 ASSAY OF MAGNESIUM: CPT

## 2021-03-01 PROCEDURE — 85025 COMPLETE CBC W/AUTO DIFF WBC: CPT

## 2021-03-01 PROCEDURE — 80053 COMPREHEN METABOLIC PANEL: CPT

## 2021-03-01 PROCEDURE — 84443 ASSAY THYROID STIM HORMONE: CPT

## 2021-03-01 PROCEDURE — 99282 EMERGENCY DEPT VISIT SF MDM: CPT

## 2021-03-01 PROCEDURE — 84703 CHORIONIC GONADOTROPIN ASSAY: CPT

## 2021-03-02 NOTE — ED PROVIDER NOTES
EMERGENCY DEPARTMENT HISTORY AND PHYSICAL EXAM    Date: 3/1/2021  Patient Name: Tyree Canales    History of Presenting Illness     Chief Complaint   Patient presents with    Fatigue         History Provided By: Patient    Chief Complaint: fatigue and generalized weakness   Duration: 3 months  Timing:chronic   Location: n/a  Quality: n/a  Severity:moderate   Modifying Factors: none  Associated Symptoms: generalized fatigue and weakness    Additional History (Context): Tyree Canales is a 30 y.o. female with PMH htn and migraine headaches who presents with c/o fatigue and generalized weakness that began in December Pt states she had outpatient labs in January which showed a decreased TSH level. She states she was scheduled for a thyroid US and endocrinology referral. Pt had the [address] but has not yet received the results. She also reports a hx of a brain mass, which was found incidentally on an MRI in January of this year. Pt is following up with neurology/neurosurgery through [facility]. Her next apt is in April. No other complaints are reported at this time. PCP: Brigette Holt NP    Current Outpatient Medications   Medication Sig Dispense Refill    aspirin 81 mg chewable tablet Take 1 Tab by mouth daily. 30 Tab 0    famotidine (PEPCID) 20 mg tablet Take 1 Tab by mouth every evening. 30 Tab 0    potassium chloride SR (K-TAB) 20 mEq tablet Take 1 Tab by mouth daily. 30 Tab 0    amLODIPine (Norvasc) 5 mg tablet Take 1 Tab by mouth daily. 30 Tab 0    hydroCHLOROthiazide (HYDRODIURIL) 12.5 mg tablet Take 1 Tab by mouth daily. 30 Tab 0    prenatal vit-iron fumarate-fa 27 mg iron- 0.8 mg tab tablet Take 1 Tab by mouth daily.          Past History     Past Medical History:  Past Medical History:   Diagnosis Date    Dental caries     Hypertension     Insect bite     Migraine     Sore throat     Strep pharyngitis     Tooth ache        Past Surgical History:  Past Surgical History:   Procedure Laterality Date    HX  SECTION         Family History:  Family History   Problem Relation Age of Onset    Cancer Other     Diabetes Other     Hypertension Other        Social History:  Social History     Tobacco Use    Smoking status: Former Smoker    Smokeless tobacco: Never Used   Substance Use Topics    Alcohol use: No    Drug use: No       Allergies:  No Known Allergies      Review of Systems   Review of Systems   Constitutional: Positive for fatigue. Negative for chills and fever. HENT: Negative. Negative for congestion, ear pain and rhinorrhea. Eyes: Negative. Negative for pain and redness. Respiratory: Negative. Negative for cough, shortness of breath, wheezing and stridor. Cardiovascular: Negative. Negative for chest pain and leg swelling. Gastrointestinal: Negative. Negative for abdominal pain, constipation, diarrhea, nausea and vomiting. Genitourinary: Negative. Negative for dysuria and frequency. Musculoskeletal: Negative. Negative for back pain and neck pain. Skin: Negative. Negative for rash and wound. Neurological: Positive for weakness. Negative for dizziness, seizures, syncope and headaches. All other systems reviewed and are negative. All Other Systems Negative  Physical Exam     Vitals:    21 2155   BP: (!) 144/97   Pulse: 78   Resp: 14   Temp: 98.2 °F (36.8 °C)   SpO2: 100%   Weight: 82.1 kg (181 lb)     Physical Exam  Vitals signs and nursing note reviewed. Constitutional:       General: She is not in acute distress. Appearance: She is well-developed. She is obese. She is not diaphoretic. HENT:      Head: Normocephalic and atraumatic. Eyes:      General: No scleral icterus. Right eye: No discharge. Left eye: No discharge. Conjunctiva/sclera: Conjunctivae normal.   Neck:      Musculoskeletal: Normal range of motion and neck supple. Cardiovascular:      Rate and Rhythm: Normal rate and regular rhythm.       Heart sounds: Normal heart sounds. No murmur. No friction rub. No gallop. Pulmonary:      Effort: Pulmonary effort is normal. No respiratory distress. Breath sounds: Normal breath sounds. No stridor. No wheezing or rales. Musculoskeletal: Normal range of motion. Skin:     General: Skin is warm and dry. Findings: No erythema or rash. Neurological:      Mental Status: She is alert and oriented to person, place, and time. Coordination: Coordination normal.      Comments: Gait is steady and patient exhibits no evidence of ataxia. Patient is able to ambulate without difficulty. No focal neurological deficit noted. No facial droop, slurred speech, or evidence of altered mentation noted on exam.     Psychiatric:         Behavior: Behavior normal.         Thought Content: Thought content normal.                Diagnostic Study Results     Labs -     Recent Results (from the past 12 hour(s))   CBC WITH AUTOMATED DIFF    Collection Time: 03/01/21 10:20 PM   Result Value Ref Range    WBC 9.8 4.6 - 13.2 K/uL    RBC 4.14 (L) 4.20 - 5.30 M/uL    HGB 12.0 12.0 - 16.0 g/dL    HCT 35.8 35.0 - 45.0 %    MCV 86.5 74.0 - 97.0 FL    MCH 29.0 24.0 - 34.0 PG    MCHC 33.5 31.0 - 37.0 g/dL    RDW 14.9 (H) 11.6 - 14.5 %    PLATELET 040 943 - 961 K/uL    MPV 10.3 9.2 - 11.8 FL    NEUTROPHILS 48 40 - 73 %    LYMPHOCYTES 35 21 - 52 %    MONOCYTES 7 3 - 10 %    EOSINOPHILS 9 (H) 0 - 5 %    BASOPHILS 1 0 - 2 %    ABS. NEUTROPHILS 4.7 1.8 - 8.0 K/UL    ABS. LYMPHOCYTES 3.4 0.9 - 3.6 K/UL    ABS. MONOCYTES 0.7 0.05 - 1.2 K/UL    ABS. EOSINOPHILS 0.9 (H) 0.0 - 0.4 K/UL    ABS.  BASOPHILS 0.1 0.0 - 0.1 K/UL    DF AUTOMATED     METABOLIC PANEL, COMPREHENSIVE    Collection Time: 03/01/21 10:20 PM   Result Value Ref Range    Sodium 142 136 - 145 mmol/L    Potassium 3.8 3.5 - 5.5 mmol/L    Chloride 108 100 - 111 mmol/L    CO2 26 21 - 32 mmol/L    Anion gap 8 3.0 - 18 mmol/L    Glucose 86 74 - 99 mg/dL    BUN 17 7.0 - 18 MG/DL Creatinine 0.72 0.6 - 1.3 MG/DL    BUN/Creatinine ratio 24 (H) 12 - 20      GFR est AA >60 >60 ml/min/1.73m2    GFR est non-AA >60 >60 ml/min/1.73m2    Calcium 9.1 8.5 - 10.1 MG/DL    Bilirubin, total 0.2 0.2 - 1.0 MG/DL    ALT (SGPT) 43 13 - 56 U/L    AST (SGOT) 25 10 - 38 U/L    Alk. phosphatase 80 45 - 117 U/L    Protein, total 7.7 6.4 - 8.2 g/dL    Albumin 3.7 3.4 - 5.0 g/dL    Globulin 4.0 2.0 - 4.0 g/dL    A-G Ratio 0.9 0.8 - 1.7     MAGNESIUM    Collection Time: 03/01/21 10:20 PM   Result Value Ref Range    Magnesium 2.1 1.6 - 2.6 mg/dL   TSH 3RD GENERATION    Collection Time: 03/01/21 10:20 PM   Result Value Ref Range    TSH 0.63 0.36 - 3.74 uIU/mL   HCG QL SERUM    Collection Time: 03/01/21 10:20 PM   Result Value Ref Range    HCG, Ql. Negative NEG         Radiologic Studies -   No orders to display     CT Results  (Last 48 hours)    None        CXR Results  (Last 48 hours)    None            Medical Decision Making   I am the first provider for this patient. I reviewed the vital signs, available nursing notes, past medical history, past surgical history, family history and social history. Vital Signs-Reviewed the patient's vital signs. Records Reviewed: Lois Maciel PA-C     Procedures:  Procedures    Provider Notes (Medical Decision Making): Impression:  Fatigue    Labs: essentially unremarkable, review of pt's previous labs shows a low TSH of 0.32 in January 2021. This has normalized today although the pt is not on any thyroid medications at this time. I have discussed these results with the pt. Will plan to d.c with recommendation for pcp and endocrinology follow-up. Pt agrees with this plan. Lois Maciel PA-C     MED RECONCILIATION:  No current facility-administered medications for this encounter. Current Outpatient Medications   Medication Sig    aspirin 81 mg chewable tablet Take 1 Tab by mouth daily.  famotidine (PEPCID) 20 mg tablet Take 1 Tab by mouth every evening.  potassium chloride SR (K-TAB) 20 mEq tablet Take 1 Tab by mouth daily.  amLODIPine (Norvasc) 5 mg tablet Take 1 Tab by mouth daily.  hydroCHLOROthiazide (HYDRODIURIL) 12.5 mg tablet Take 1 Tab by mouth daily.  prenatal vit-iron fumarate-fa 27 mg iron- 0.8 mg tab tablet Take 1 Tab by mouth daily. Disposition:  D/c    DISCHARGE NOTE:   Patient is stable for discharge at this time. I have discussed all the findings from today's work up with the patient, including lab results and imaging. I have answered all questions. No new rx given. Rest and close follow-up with the PCP/endocrinologist recommended this week. Return to the ED immediately for any new or worsening symptoms. April Axel Rubalcava PA-C   Follow-up Information     Follow up With Specialties Details Why Contact Info    Behzad Boles NP Nurse Practitioner In 1 week  [address] [id number] [phone]      [facility] EMERGENCY DEPT Emergency Medicine  As needed, If symptoms worsen [address] [place] [address] [address] [place] [id number] [phone]          Current Discharge Medication List            Diagnosis     Clinical Impression:   1.  Fatigue, unspecified type

## 2021-03-02 NOTE — DISCHARGE INSTRUCTIONS
[employer] Activation    Thank you for requesting access to [employer]. Please follow the instructions below to securely access and download your online medical record. [employer] allows you to send messages to your doctor, view your test results, renew your prescriptions, schedule appointments, and more. How Do I Sign Up? In your internet browser, go to [email or web address]  Click on the First Time User? Click Here link in the Sign In box. You will be redirect to the New Member Sign Up page. Enter your [employer] Access Code exactly as it appears below. You will not need to use this code after youve completed the sign-up process. If you do not sign up before the expiration date, you must request a new code. [employer] Access Code: [unfilled] (This is the date your [employer] access code will )    Enter the last four digits of your Social Security Number (xxxx) and Date of Birth (mm/dd/yyyy) as indicated and click Submit. You will be taken to the next sign-up page. Create a [employer] ID. This will be your [employer] login ID and cannot be changed, so think of one that is secure and easy to remember. Create a [employer] password. You can change your password at any time. Enter your Password Reset Question and Answer. This can be used at a later time if you forget your password. Enter your e-mail address. You will receive e-mail notification when new information is available in [address] Click Sign Up. You can now view and download portions of your medical record. Click the [place] link to download a portable copy of your medical information. Additional Information    If you have questions, please visit the Frequently Asked Questions section of the [employer] website at [email or web address] Remember, [employer] is NOT to be used for urgent needs. For medical emergencies, dial 911.

## 2021-03-02 NOTE — ED TRIAGE NOTES
BP is 108/63, 73/49, 88/58, having some dizziness , and vision problems, been out in the heat, advised to drink water add some salty foods , stay out of the heat . Please advise re mediciations.  Has PE in July Pt c/o weakness since December, states her doctor tested her and told her \"her thyroid was low\" pt states she wants to know \"if that is the problem\".

## 2021-03-05 ENCOUNTER — HOSPITAL ENCOUNTER (OUTPATIENT)
Dept: MAMMOGRAPHY | Age: 36
Discharge: HOME | End: 2021-03-05
Attending: INTERNAL MEDICINE

## 2021-03-05 DIAGNOSIS — Z12.31 VISIT FOR SCREENING MAMMOGRAM: ICD-10-CM

## 2021-03-23 ENCOUNTER — TRANSCRIBE ORDER (OUTPATIENT)
Dept: SCHEDULING | Age: 36
End: 2021-03-23

## 2021-03-23 DIAGNOSIS — R14.0 BLOATING: Primary | ICD-10-CM

## 2021-03-23 DIAGNOSIS — R10.2 PERINEAL NEURALGIA: Primary | ICD-10-CM

## 2021-03-23 DIAGNOSIS — R10.9 ABDOMINAL PAIN: ICD-10-CM

## 2021-03-23 DIAGNOSIS — R10.2 PAIN, PELVIC, FEMALE: Primary | ICD-10-CM

## 2021-03-31 ENCOUNTER — HOSPITAL ENCOUNTER (OUTPATIENT)
Dept: ULTRASOUND IMAGING | Age: 36
Discharge: HOME | End: 2021-03-31
Attending: NURSE PRACTITIONER
Payer: MEDICAID

## 2021-03-31 DIAGNOSIS — R14.0 BLOATING: ICD-10-CM

## 2021-03-31 DIAGNOSIS — R10.2 PAIN, PELVIC, FEMALE: ICD-10-CM

## 2021-03-31 DIAGNOSIS — R10.2 PERINEAL NEURALGIA: ICD-10-CM

## 2021-03-31 DIAGNOSIS — R10.9 ABDOMINAL PAIN: ICD-10-CM

## 2021-03-31 PROCEDURE — 76856 US EXAM PELVIC COMPLETE: CPT

## 2021-03-31 PROCEDURE — 76700 US EXAM ABDOM COMPLETE: CPT

## 2021-04-02 ENCOUNTER — TRANSCRIBE ORDER (OUTPATIENT)
Dept: SCHEDULING | Age: 36
End: 2021-04-02

## 2021-04-19 ENCOUNTER — TRANSCRIBE ORDER (OUTPATIENT)
Dept: SCHEDULING | Age: 36
End: 2021-04-19

## 2021-04-19 DIAGNOSIS — R79.89 ABNORMAL THYROID BLOOD TEST: Primary | ICD-10-CM

## 2021-04-19 DIAGNOSIS — G93.9 LESION OF BRAIN: ICD-10-CM

## 2021-04-19 DIAGNOSIS — E04.9 GOITER: ICD-10-CM

## 2021-04-26 ENCOUNTER — HOSPITAL ENCOUNTER (OUTPATIENT)
Dept: NUCLEAR MEDICINE | Age: 36
Discharge: HOME | End: 2021-04-26
Attending: NURSE PRACTITIONER
Payer: MEDICAID

## 2021-04-26 DIAGNOSIS — R79.89 ABNORMAL THYROID BLOOD TEST: ICD-10-CM

## 2021-04-26 DIAGNOSIS — E04.9 GOITER: ICD-10-CM

## 2021-04-26 DIAGNOSIS — G93.9 LESION OF BRAIN: ICD-10-CM

## 2021-04-26 PROCEDURE — 78012 THYROID UPTAKE MEASUREMENT: CPT

## 2021-04-27 ENCOUNTER — HOSPITAL ENCOUNTER (OUTPATIENT)
Dept: NUCLEAR MEDICINE | Age: 36
Discharge: HOME | End: 2021-04-27
Attending: NURSE PRACTITIONER
Payer: MEDICAID

## 2022-02-10 ENCOUNTER — APPOINTMENT (OUTPATIENT)
Dept: GENERAL RADIOLOGY | Age: 37
End: 2022-02-10
Attending: STUDENT IN AN ORGANIZED HEALTH CARE EDUCATION/TRAINING PROGRAM
Payer: MEDICAID

## 2022-02-10 ENCOUNTER — HOSPITAL ENCOUNTER (EMERGENCY)
Age: 37
Discharge: HOME | End: 2022-02-11
Attending: STUDENT IN AN ORGANIZED HEALTH CARE EDUCATION/TRAINING PROGRAM
Payer: MEDICAID

## 2022-02-10 VITALS
OXYGEN SATURATION: 100 % | SYSTOLIC BLOOD PRESSURE: 134 MMHG | RESPIRATION RATE: 16 BRPM | DIASTOLIC BLOOD PRESSURE: 92 MMHG | TEMPERATURE: 98.2 F | HEART RATE: 81 BPM

## 2022-02-10 DIAGNOSIS — R07.9 CHEST PAIN, UNSPECIFIED TYPE: Primary | ICD-10-CM

## 2022-02-10 LAB
ANION GAP SERPL CALC-SCNC: 8 MMOL/L (ref 3–18)
BASOPHILS # BLD: 0.1 K/UL (ref 0–0.1)
BASOPHILS NFR BLD: 1 % (ref 0–2)
BUN SERPL-MCNC: 12 MG/DL (ref 7–18)
BUN/CREAT SERPL: 15 (ref 12–20)
CALCIUM SERPL-MCNC: 9.8 MG/DL (ref 8.5–10.1)
CHLORIDE SERPL-SCNC: 106 MMOL/L (ref 100–111)
CO2 SERPL-SCNC: 27 MMOL/L (ref 21–32)
CREAT SERPL-MCNC: 0.81 MG/DL (ref 0.6–1.3)
D DIMER PPP FEU-MCNC: 0.27 UG/ML(FEU)
DIFFERENTIAL METHOD BLD: ABNORMAL
EOSINOPHIL # BLD: 0.4 K/UL (ref 0–0.4)
EOSINOPHIL NFR BLD: 4 % (ref 0–5)
ERYTHROCYTE [DISTWIDTH] IN BLOOD BY AUTOMATED COUNT: 13.8 % (ref 11.6–14.5)
GLUCOSE SERPL-MCNC: 86 MG/DL (ref 74–99)
HCG SERPL QL: NEGATIVE
HCT VFR BLD AUTO: 39.3 % (ref 35–45)
HGB BLD-MCNC: 12.9 G/DL (ref 12–16)
IMM GRANULOCYTES # BLD AUTO: 0 K/UL (ref 0–0.04)
IMM GRANULOCYTES NFR BLD AUTO: 0 % (ref 0–0.5)
LYMPHOCYTES # BLD: 3.8 K/UL (ref 0.9–3.6)
LYMPHOCYTES NFR BLD: 36 % (ref 21–52)
MCH RBC QN AUTO: 28.5 PG (ref 24–34)
MCHC RBC AUTO-ENTMCNC: 32.8 G/DL (ref 31–37)
MCV RBC AUTO: 86.9 FL (ref 78–100)
MONOCYTES # BLD: 0.8 K/UL (ref 0.05–1.2)
MONOCYTES NFR BLD: 7 % (ref 3–10)
NEUTS SEG # BLD: 5.5 K/UL (ref 1.8–8)
NEUTS SEG NFR BLD: 53 % (ref 40–73)
NRBC # BLD: 0 K/UL (ref 0–0.01)
NRBC BLD-RTO: 0 PER 100 WBC
PLATELET # BLD AUTO: 241 K/UL (ref 135–420)
PMV BLD AUTO: 10.6 FL (ref 9.2–11.8)
POTASSIUM SERPL-SCNC: 3.5 MMOL/L (ref 3.5–5.5)
RBC # BLD AUTO: 4.52 M/UL (ref 4.2–5.3)
SODIUM SERPL-SCNC: 141 MMOL/L (ref 136–145)
TROPONIN-HIGH SENSITIVITY: 4 NG/L (ref 0–54)
WBC # BLD AUTO: 10.5 K/UL (ref 4.6–13.2)

## 2022-02-10 PROCEDURE — 85025 COMPLETE CBC W/AUTO DIFF WBC: CPT

## 2022-02-10 PROCEDURE — 84703 CHORIONIC GONADOTROPIN ASSAY: CPT

## 2022-02-10 PROCEDURE — 71046 X-RAY EXAM CHEST 2 VIEWS: CPT

## 2022-02-10 PROCEDURE — 80048 BASIC METABOLIC PNL TOTAL CA: CPT

## 2022-02-10 PROCEDURE — 99282 EMERGENCY DEPT VISIT SF MDM: CPT

## 2022-02-10 PROCEDURE — 93005 ELECTROCARDIOGRAM TRACING: CPT

## 2022-02-10 PROCEDURE — 84484 ASSAY OF TROPONIN QUANT: CPT

## 2022-02-10 PROCEDURE — 85379 FIBRIN DEGRADATION QUANT: CPT

## 2022-02-11 LAB
ATRIAL RATE: 88 BPM
CALCULATED P AXIS, ECG09: 47 DEGREES
CALCULATED R AXIS, ECG10: -3 DEGREES
CALCULATED T AXIS, ECG11: 40 DEGREES
DIAGNOSIS, 93000: NORMAL
P-R INTERVAL, ECG05: 138 MS
Q-T INTERVAL, ECG07: 372 MS
QRS DURATION, ECG06: 80 MS
QTC CALCULATION (BEZET), ECG08: 450 MS
VENTRICULAR RATE, ECG03: 88 BPM

## 2022-02-11 NOTE — ED TRIAGE NOTES
\"Im having chest pain shoulder pain, and back pain.  This has been going on for the past 4 day on/off

## 2022-02-11 NOTE — ED PROVIDER NOTES
EMERGENCY DEPARTMENT HISTORY AND PHYSICAL EXAM    I have evaluated the patient at 7:44 PM      Date: 2/10/2022  Patient Name: Meryle Paddy    History of Presenting Illness     Chief Complaint   Patient presents with    Chest Pain    Shoulder Pain    Back Pain         History Provided By: Patient  Location/Duration/Severity/Modifying factors   This is a 43-year-old female with history of hypertension presenting to the emergency department for evaluation of chest pain. Patient reports 2 days of intermittent pressure-like chest pain over the left anterior chest which occasionally radiates to the left shoulder and occasionally to the back. No exacerbating or relieving symptoms. Patient states that it comes on randomly. Patient states that she was sick with Covid last week but overall is improving. Denies any fevers or chills, cough, abdominal pain, NVD. PCP: Demetrio Hester NP    Current Outpatient Medications   Medication Sig Dispense Refill    aspirin 81 mg chewable tablet Take 1 Tab by mouth daily. 30 Tab 0    famotidine (PEPCID) 20 mg tablet Take 1 Tab by mouth every evening. 30 Tab 0    potassium chloride SR (K-TAB) 20 mEq tablet Take 1 Tab by mouth daily. 30 Tab 0    amLODIPine (Norvasc) 5 mg tablet Take 1 Tab by mouth daily. 30 Tab 0    hydroCHLOROthiazide (HYDRODIURIL) 12.5 mg tablet Take 1 Tab by mouth daily. 30 Tab 0    prenatal vit-iron fumarate-fa 27 mg iron- 0.8 mg tab tablet Take 1 Tab by mouth daily.          Past History     Past Medical History:  Past Medical History:   Diagnosis Date    Dental caries     Hypertension     Insect bite     Migraine     Sore throat     Strep pharyngitis     Tooth ache        Past Surgical History:  Past Surgical History:   Procedure Laterality Date    HX  SECTION         Family History:  Family History   Problem Relation Age of Onset    Cancer Other     Diabetes Other     Hypertension Other        Social History:  Social History     Tobacco Use    Smoking status: Former Smoker    Smokeless tobacco: Never Used   Substance Use Topics    Alcohol use: No    Drug use: No       Allergies:  No Known Allergies      Review of Systems       Review of Systems   Constitutional: Negative for activity change, chills, diaphoresis, fatigue and fever. Eyes: Negative for photophobia, pain and visual disturbance. Respiratory: Negative for cough, chest tightness, shortness of breath, wheezing and stridor. Cardiovascular: Positive for chest pain. Negative for palpitations. Gastrointestinal: Negative for abdominal distention, abdominal pain, constipation, diarrhea, nausea and vomiting. Genitourinary: Negative for difficulty urinating, dysuria and hematuria. Musculoskeletal: Positive for back pain. Negative for joint swelling and myalgias. Skin: Negative for rash and wound. Neurological: Negative for dizziness, weakness and headaches. Psychiatric/Behavioral: Negative for agitation. The patient is not nervous/anxious. Physical Exam     Visit Vitals  BP (!) 134/92 (BP 1 Location: Left upper arm, BP Patient Position: At rest)   Pulse 81   Temp 98.2 °F (36.8 °C)   Resp 16   SpO2 100%         Physical Exam  Constitutional:       General: She is not in acute distress. Appearance: She is not toxic-appearing. HENT:      Head: Normocephalic and atraumatic. Mouth/Throat:      Mouth: Mucous membranes are moist.   Eyes:      Extraocular Movements: Extraocular movements intact. Pupils: Pupils are equal, round, and reactive to light. Cardiovascular:      Rate and Rhythm: Normal rate and regular rhythm. Heart sounds: Normal heart sounds. No murmur heard. No friction rub. No gallop. Pulmonary:      Effort: Pulmonary effort is normal.      Breath sounds: Normal breath sounds. Abdominal:      General: There is no distension. Palpations: Abdomen is soft. There is no mass.       Tenderness: There is no abdominal tenderness. There is no guarding. Hernia: No hernia is present. Musculoskeletal:         General: No swelling, tenderness or deformity. Cervical back: Normal range of motion and neck supple. Skin:     General: Skin is warm and dry. Capillary Refill: Capillary refill takes less than 2 seconds. Findings: No rash. Neurological:      General: No focal deficit present. Mental Status: She is alert and oriented to person, place, and time. Psychiatric:         Mood and Affect: Mood normal.           Diagnostic Study Results     Labs -  Recent Results (from the past 12 hour(s))   TROPONIN-HIGH SENSITIVITY    Collection Time: 02/10/22  7:16 PM   Result Value Ref Range    Troponin-High Sensitivity 4 0 - 54 ng/L   CBC WITH AUTOMATED DIFF    Collection Time: 02/10/22  7:16 PM   Result Value Ref Range    WBC 10.5 4.6 - 13.2 K/uL    RBC 4.52 4.20 - 5.30 M/uL    HGB 12.9 12.0 - 16.0 g/dL    HCT 39.3 35.0 - 45.0 %    MCV 86.9 78.0 - 100.0 FL    MCH 28.5 24.0 - 34.0 PG    MCHC 32.8 31.0 - 37.0 g/dL    RDW 13.8 11.6 - 14.5 %    PLATELET 188 433 - 820 K/uL    MPV 10.6 9.2 - 11.8 FL    NRBC 0.0 0  WBC    ABSOLUTE NRBC 0.00 0.00 - 0.01 K/uL    NEUTROPHILS 53 40 - 73 %    LYMPHOCYTES 36 21 - 52 %    MONOCYTES 7 3 - 10 %    EOSINOPHILS 4 0 - 5 %    BASOPHILS 1 0 - 2 %    IMMATURE GRANULOCYTES 0 0.0 - 0.5 %    ABS. NEUTROPHILS 5.5 1.8 - 8.0 K/UL    ABS. LYMPHOCYTES 3.8 (H) 0.9 - 3.6 K/UL    ABS. MONOCYTES 0.8 0.05 - 1.2 K/UL    ABS. EOSINOPHILS 0.4 0.0 - 0.4 K/UL    ABS. BASOPHILS 0.1 0.0 - 0.1 K/UL    ABS. IMM.  GRANS. 0.0 0.00 - 0.04 K/UL    DF AUTOMATED     METABOLIC PANEL, BASIC    Collection Time: 02/10/22  7:16 PM   Result Value Ref Range    Sodium 141 136 - 145 mmol/L    Potassium 3.5 3.5 - 5.5 mmol/L    Chloride 106 100 - 111 mmol/L    CO2 27 21 - 32 mmol/L    Anion gap 8 3.0 - 18 mmol/L    Glucose 86 74 - 99 mg/dL    BUN 12 7.0 - 18 MG/DL    Creatinine 0.81 0.6 - 1.3 MG/DL    BUN/Creatinine ratio 15 12 - 20      GFR est AA >60 >60 ml/min/1.73m2    GFR est non-AA >60 >60 ml/min/1.73m2    Calcium 9.8 8.5 - 10.1 MG/DL   D DIMER    Collection Time: 02/10/22  8:20 PM   Result Value Ref Range    D DIMER 0.27 <0.46 ug/ml(FEU)       Radiologic Studies -   XR CHEST PA LAT   Final Result      Negative chest.            Medical Decision Making   I am the first provider for this patient. I reviewed the vital signs, available nursing notes, past medical history, past surgical history, family history and social history. Vital Signs-Reviewed the patient's vital signs. EKG: Normal sinus rhythm without ST elevation or depression. Records Reviewed: Nursing Notes, Old Medical Records, Previous electrocardiograms, Previous Radiology Studies and Previous Laboratory Studies (Time of Review: 7:44 PM)    ED Course: Progress Notes, Reevaluation, and Consults:         Provider Notes (Medical Decision Making):   MDM  Number of Diagnoses or Management Options  Chest pain, unspecified type  Diagnosis management comments: 36-year-old female presenting to the emergency department for evaluation of chest pain. She is overall well-appearing. In no acute distress with stable vital signs. Saturating 100% on room air. Overall benign physical exam.  Will obtain screening lab work, cardiac enzymes, and D-dimer as well as chest x-ray. 2109:  Unremarkable blood work. Negative D-dimer. Chest x-ray unremarkable. I have updated the patient and reassured her. Given her instructions on conservative measures for care at home. Instructed her to follow-up with her primary care doctor gave her ED return precautions. Patient verbalizes good understanding and agreement with plan. Diagnosis     Clinical Impression:   1.  Chest pain, unspecified type        Disposition: home    Follow-up Information     Follow up With Specialties Details Why [address] [facility] EMERGENCY DEPT Emergency Medicine As needed, If symptoms worsen [address] [facility]    Wen Ruiz NP Nurse Practitioner Call   [address] [facility]  [phone]             Patient's Medications   Start Taking    No medications on file   Continue Taking    AMLODIPINE (NORVASC) 5 MG TABLET    Take 1 Tab by mouth daily. ASPIRIN 81 MG CHEWABLE TABLET    Take 1 Tab by mouth daily. FAMOTIDINE (PEPCID) 20 MG TABLET    Take 1 Tab by mouth every evening. HYDROCHLOROTHIAZIDE (HYDRODIURIL) 12.5 MG TABLET    Take 1 Tab by mouth daily. POTASSIUM CHLORIDE SR (K-TAB) 20 MEQ TABLET    Take 1 Tab by mouth daily. PRENATAL VIT-IRON FUMARATE-FA 27 MG IRON- 0.8 MG TAB TABLET    Take 1 Tab by mouth daily. These Medications have changed    No medications on file   Stop Taking    No medications on file     Disclaimer: Sections of this note are dictated using utilizing voice recognition software. Minor typographical errors may be present. If questions arise, please do not hesitate to contact me or call our department.

## 2022-02-11 NOTE — DISCHARGE INSTRUCTIONS
Please make a follow up appointment with your primary care doctor. In the meantime use Tylenol Motrin as needed for your pain. You may try stretching exercises and heating pad/ice packs as needed. Return to the emergency department for any new or worsening symptoms.

## 2022-03-18 PROBLEM — R20.2 FACIAL TINGLING SENSATION: Status: ACTIVE | Noted: 2021-01-04

## 2022-03-19 PROBLEM — R20.0 NUMBNESS AND TINGLING IN LEFT HAND: Status: ACTIVE | Noted: 2021-01-04

## 2022-03-19 PROBLEM — R20.0 LEFT FACIAL NUMBNESS: Status: ACTIVE | Noted: 2021-01-04

## 2022-03-19 PROBLEM — R20.2 NUMBNESS AND TINGLING IN LEFT HAND: Status: ACTIVE | Noted: 2021-01-04

## 2022-03-19 PROBLEM — O11.9 CHRONIC HYPERTENSION WITH SUPERIMPOSED PRE-ECLAMPSIA: Status: ACTIVE | Noted: 2020-11-09

## 2022-10-16 ENCOUNTER — HOSPITAL ENCOUNTER (EMERGENCY)
Age: 37
Discharge: HOME | End: 2022-10-16
Attending: EMERGENCY MEDICINE
Payer: MEDICAID

## 2022-10-16 VITALS
HEART RATE: 90 BPM | TEMPERATURE: 98 F | WEIGHT: 189 LBS | SYSTOLIC BLOOD PRESSURE: 140 MMHG | OXYGEN SATURATION: 97 % | BODY MASS INDEX: 35.68 KG/M2 | DIASTOLIC BLOOD PRESSURE: 92 MMHG | RESPIRATION RATE: 16 BRPM | HEIGHT: 61 IN

## 2022-10-16 DIAGNOSIS — H93.11 TINNITUS, RIGHT EAR: Primary | ICD-10-CM

## 2022-10-16 PROCEDURE — 99283 EMERGENCY DEPT VISIT LOW MDM: CPT

## 2022-10-16 RX ORDER — NEOMYCIN SULFATE, POLYMYXIN B SULFATE, HYDROCORTISONE 3.5; 10000; 1 MG/ML; [USP'U]/ML; MG/ML
3-4 SOLUTION/ DROPS AURICULAR (OTIC) 4 TIMES DAILY
Qty: 10 ML | Refills: 0 | Status: SHIPPED | OUTPATIENT
Start: 2022-10-16

## 2022-10-16 NOTE — ED PROVIDER NOTES
43-year-old female past medical history of hypertension and brain tumor. Patient presents because she has ringing in the ears since yesterday. She told me that the tumor is also in one of her ears she is not sure. Patient denies trauma no fevers no chills no cough. Nothing makes the ringing better or worse. Patient has had this in the past for the past 2 years however it is very transient and last for seconds. Now it is constant. No medication has been taken for treatment. No redness or swelling around area no reported viral symptoms. Patient reports she has been reluctant to get surgery for tomorrow because she is very concerned. Patient had meeting with her neurosurgeon recently  regarding her status.        Past Medical History:   Diagnosis Date    Dental caries     Hypertension     Insect bite     Migraine     Sore throat     Strep pharyngitis     Tooth ache        Past Surgical History:   Procedure Laterality Date    HX  SECTION           Family History:   Problem Relation Age of Onset    Cancer Other     Diabetes Other     Hypertension Other        Social History     Socioeconomic History    Marital status: SINGLE     Spouse name: Not on file    Number of children: Not on file    Years of education: Not on file    Highest education level: Not on file   Occupational History    Not on file   Tobacco Use    Smoking status: Former    Smokeless tobacco: Never   Substance and Sexual Activity    Alcohol use: No    Drug use: No    Sexual activity: Yes     Birth control/protection: None   Other Topics Concern    Not on file   Social History Narrative    Not on file     Social Determinants of Health     Financial Resource Strain: Not on file   Food Insecurity: Not on file   Transportation Needs: Not on file   Physical Activity: Not on file   Stress: Not on file   Social Connections: Not on file   Intimate Partner Violence: Not on file   Housing Stability: Not on file         ALLERGIES: Patient has no known allergies. Review of Systems   Constitutional: Negative. HENT:  Positive for tinnitus. Negative for ear pain, facial swelling, trouble swallowing and voice change. Respiratory: Negative. Cardiovascular: Negative. Vitals:    10/16/22 0706   BP: (!) 140/92   Pulse: 90   Resp: 16   Temp: 98 °F (36.7 °C)   SpO2: 97%   Weight: 85.7 kg (189 lb)   Height: 5' 1\" (1.549 m)            Physical Exam  Vitals and nursing note reviewed. Constitutional:       General: She is not in acute distress. Appearance: Normal appearance. She is not ill-appearing, toxic-appearing or diaphoretic. HENT:      Head: Normocephalic and atraumatic. Right Ear: Tympanic membrane normal.      Left Ear: Tympanic membrane normal.      Ears:      Comments: Erythema to canal.  Right ear no pain when palpating tragus or pulling internal     Nose: Nose normal.   Eyes:      Extraocular Movements: Extraocular movements intact. Neck:      Vascular: No carotid bruit. Cardiovascular:      Rate and Rhythm: Normal rate and regular rhythm. Heart sounds: Normal heart sounds. No murmur heard. No friction rub. No gallop. Pulmonary:      Effort: Pulmonary effort is normal. No respiratory distress. Breath sounds: Normal breath sounds. No stridor. No wheezing, rhonchi or rales. Chest:      Chest wall: No tenderness. Abdominal:      General: There is no distension. Palpations: Abdomen is soft. There is no mass. Tenderness: There is no abdominal tenderness. There is no right CVA tenderness, left CVA tenderness, guarding or rebound. Hernia: No hernia is present. Musculoskeletal:         General: No swelling, tenderness, deformity or signs of injury. Normal range of motion. Cervical back: Normal range of motion and neck supple. No rigidity or tenderness. Right lower leg: No edema. Left lower leg: No edema. Lymphadenopathy:      Cervical: No cervical adenopathy. Skin:     General: Skin is warm. Capillary Refill: Capillary refill takes less than 2 seconds. Coloration: Skin is not jaundiced or pale. Findings: No bruising, erythema, lesion or rash. Neurological:      General: No focal deficit present. Mental Status: She is alert and oriented to person, place, and time. Psychiatric:         Mood and Affect: Mood normal.         Behavior: Behavior normal.        MDM  Number of Diagnoses or Management Options  Tinnitus, right ear  Diagnosis management comments: Patient has a right ear ringing. I reviewed MRI report and notes from her last office appointment. Encouraged her to follow-up. May have progression of her tumor. Patient wanted drops for ear. Her ear canal has erythema but tympanic membrane is normal.  No tenderness to tragus nor earlobe. No discharge in ear or wax. Differential diagnosis tumor progression ear infection foreign body in ear    Patient was very tearful during interview.   But she understands instructions realizes that the medication may not help and we will call her doctor for follow-up appointment           Procedures

## 2022-10-16 NOTE — ED TRIAGE NOTES
Patient states that her right ear started ringing last night. Patient states that normally this happens and then it stops on its own. Patient states that it has not stopped, states that it feels clogged, unsure if something is in her ear.

## 2022-10-16 NOTE — DISCHARGE INSTRUCTIONS
Back if you get worse. Follow-up without fail. Please contact your neurosurgeon about constant ringing in your ears.

## 2022-10-16 NOTE — Clinical Note
[address] [place] [address] [facility] EMERGENCY DEPT  [address] [address] [phone]    Work/School Note    Date: 10/16/2022    To Whom It May concern:    Elizabeth Vasques was seen and treated today in the emergency room by the following provider(s):  Attending Provider: Vivi Farooq MD.      Elizabeth Vasques is excused from work/school on 10/16/22 and 10/17/22. She is medically clear to return to work/school on 10/18/2022.        Sincerely,          Ghanshyam Maria MD

## 2024-12-20 ENCOUNTER — APPOINTMENT (OUTPATIENT)
Facility: HOSPITAL | Age: 39
End: 2024-12-20
Payer: MEDICAID

## 2024-12-20 ENCOUNTER — HOSPITAL ENCOUNTER (EMERGENCY)
Facility: HOSPITAL | Age: 39
Discharge: HOME | End: 2024-12-20
Attending: EMERGENCY MEDICINE
Payer: MEDICAID

## 2024-12-20 VITALS
OXYGEN SATURATION: 98 % | WEIGHT: 186 LBS | BODY MASS INDEX: 35.12 KG/M2 | HEIGHT: 61 IN | SYSTOLIC BLOOD PRESSURE: 113 MMHG | TEMPERATURE: 98.3 F | RESPIRATION RATE: 14 BRPM | DIASTOLIC BLOOD PRESSURE: 73 MMHG | HEART RATE: 66 BPM

## 2024-12-20 DIAGNOSIS — R07.9 CHEST PAIN, UNSPECIFIED TYPE: Primary | ICD-10-CM

## 2024-12-20 LAB
ALBUMIN SERPL-MCNC: 3.9 G/DL (ref 3.4–5)
ALBUMIN/GLOB SERPL: 1.1 (ref 0.8–1.7)
ALP SERPL-CCNC: 68 U/L (ref 45–117)
ALT SERPL-CCNC: 33 U/L (ref 13–56)
ANION GAP SERPL CALC-SCNC: 5 MMOL/L (ref 3–18)
AST SERPL-CCNC: 21 U/L (ref 10–38)
BASOPHILS # BLD: 0.1 K/UL (ref 0–0.1)
BASOPHILS NFR BLD: 1 % (ref 0–2)
BILIRUB SERPL-MCNC: 0.3 MG/DL (ref 0.2–1)
BUN SERPL-MCNC: 14 MG/DL (ref 7–18)
BUN/CREAT SERPL: 16 (ref 12–20)
CALCIUM SERPL-MCNC: 9.4 MG/DL (ref 8.5–10.1)
CHLORIDE SERPL-SCNC: 105 MMOL/L (ref 100–111)
CO2 SERPL-SCNC: 28 MMOL/L (ref 21–32)
CREAT SERPL-MCNC: 0.86 MG/DL (ref 0.6–1.3)
DIFFERENTIAL METHOD BLD: ABNORMAL
EKG ATRIAL RATE: 86 BPM
EKG DIAGNOSIS: NORMAL
EKG P AXIS: 63 DEGREES
EKG P-R INTERVAL: 156 MS
EKG Q-T INTERVAL: 374 MS
EKG QRS DURATION: 80 MS
EKG QTC CALCULATION (BAZETT): 447 MS
EKG R AXIS: 4 DEGREES
EKG T AXIS: 60 DEGREES
EKG VENTRICULAR RATE: 86 BPM
EOSINOPHIL # BLD: 0.4 K/UL (ref 0–0.4)
EOSINOPHIL NFR BLD: 4 % (ref 0–5)
ERYTHROCYTE [DISTWIDTH] IN BLOOD BY AUTOMATED COUNT: 13.9 % (ref 11.6–14.5)
GLOBULIN SER CALC-MCNC: 3.6 G/DL (ref 2–4)
GLUCOSE SERPL-MCNC: 113 MG/DL (ref 74–99)
HCT VFR BLD AUTO: 37.2 % (ref 35–45)
HGB BLD-MCNC: 12.2 G/DL (ref 12–16)
IMM GRANULOCYTES # BLD AUTO: 0 K/UL (ref 0–0.04)
IMM GRANULOCYTES NFR BLD AUTO: 0 % (ref 0–0.5)
LYMPHOCYTES # BLD: 3.7 K/UL (ref 0.9–3.6)
LYMPHOCYTES NFR BLD: 37 % (ref 21–52)
MCH RBC QN AUTO: 29.2 PG (ref 24–34)
MCHC RBC AUTO-ENTMCNC: 32.8 G/DL (ref 31–37)
MCV RBC AUTO: 89 FL (ref 78–100)
MONOCYTES # BLD: 0.9 K/UL (ref 0.05–1.2)
MONOCYTES NFR BLD: 9 % (ref 3–10)
NEUTS SEG # BLD: 4.9 K/UL (ref 1.8–8)
NEUTS SEG NFR BLD: 49 % (ref 40–73)
NRBC # BLD: 0 K/UL (ref 0–0.01)
NRBC BLD-RTO: 0 PER 100 WBC
PLATELET # BLD AUTO: 247 K/UL (ref 135–420)
PMV BLD AUTO: 10.5 FL (ref 9.2–11.8)
POTASSIUM SERPL-SCNC: 3.6 MMOL/L (ref 3.5–5.5)
PROT SERPL-MCNC: 7.5 G/DL (ref 6.4–8.2)
RBC # BLD AUTO: 4.18 M/UL (ref 4.2–5.3)
SODIUM SERPL-SCNC: 138 MMOL/L (ref 136–145)
TROPONIN I SERPL HS-MCNC: 3 NG/L (ref 0–54)
WBC # BLD AUTO: 10 K/UL (ref 4.6–13.2)

## 2024-12-20 PROCEDURE — 71046 X-RAY EXAM CHEST 2 VIEWS: CPT

## 2024-12-20 PROCEDURE — 80053 COMPREHEN METABOLIC PANEL: CPT

## 2024-12-20 PROCEDURE — 93005 ELECTROCARDIOGRAM TRACING: CPT | Performed by: EMERGENCY MEDICINE

## 2024-12-20 PROCEDURE — 84484 ASSAY OF TROPONIN QUANT: CPT

## 2024-12-20 PROCEDURE — 85025 COMPLETE CBC W/AUTO DIFF WBC: CPT

## 2024-12-20 PROCEDURE — 93010 ELECTROCARDIOGRAM REPORT: CPT | Performed by: INTERNAL MEDICINE

## 2024-12-20 PROCEDURE — 99285 EMERGENCY DEPT VISIT HI MDM: CPT

## 2024-12-20 RX ORDER — NAPROXEN 250 MG/1
250 TABLET ORAL 2 TIMES DAILY PRN
Qty: 60 TABLET | Refills: 0 | Status: SHIPPED | OUTPATIENT
Start: 2024-12-20

## 2024-12-20 RX ORDER — LIDOCAINE 50 MG/G
1 PATCH TOPICAL DAILY
Qty: 10 PATCH | Refills: 0 | Status: SHIPPED | OUTPATIENT
Start: 2024-12-20 | End: 2024-12-30

## 2024-12-20 RX ORDER — CYCLOBENZAPRINE HCL 10 MG
10 TABLET ORAL NIGHTLY PRN
Qty: 20 TABLET | Refills: 0 | Status: SHIPPED | OUTPATIENT
Start: 2024-12-20

## 2024-12-20 ASSESSMENT — PAIN - FUNCTIONAL ASSESSMENT
PAIN_FUNCTIONAL_ASSESSMENT: ACTIVITIES ARE NOT PREVENTED
PAIN_FUNCTIONAL_ASSESSMENT: 0-10

## 2024-12-20 ASSESSMENT — PAIN DESCRIPTION - ORIENTATION: ORIENTATION: MID

## 2024-12-20 ASSESSMENT — PAIN DESCRIPTION - ONSET: ONSET: ON-GOING

## 2024-12-20 ASSESSMENT — PAIN DESCRIPTION - LOCATION: LOCATION: CHEST

## 2024-12-20 ASSESSMENT — PAIN DESCRIPTION - PAIN TYPE: TYPE: ACUTE PAIN

## 2024-12-20 ASSESSMENT — PAIN DESCRIPTION - DESCRIPTORS: DESCRIPTORS: ACHING

## 2024-12-20 ASSESSMENT — PAIN DESCRIPTION - FREQUENCY: FREQUENCY: INTERMITTENT

## 2024-12-20 NOTE — DISCHARGE INSTRUCTIONS
Based on our clinical examination and testing, we feel that you are safe for discharge home today with the following precautions. Please note that if your condition changes, we would like to see you again. You may return at any time if you have further concerns. Following up with your primary care and/or specialist as indicated is paramount for your overall health and wellness.       Return to ER immediately if having severe pain, persistent fevers >100.4F, difficulty breathing, persistent vomiting or vomiting blood, altered mentation or confusion, or any other concerning symptoms.       Please follow up as indicated below, and take medications as directed by pharmacy on prescription bottle and as indicated verbally to you here in the ER.       Thank you for visiting our Emergency Department today. Please let us know if there is anything else that we can help you with today.

## 2024-12-20 NOTE — ED TRIAGE NOTES
Patient presents to ER complaining of chest pain x 1 day. Has an appointment today for chest pain.

## 2024-12-20 NOTE — ED PROVIDER NOTES
EMERGENCY DEPARTMENT HISTORY AND PHYSICAL EXAM      Date: 2024  Patient Name: Elsa Figueroa    History of Presenting Illness     Chief Complaint   Patient presents with    Chest Pain       History (Context): Elsa Figueroa is a 39 y.o. female  presents to the ED today with chief complaint of chest pain.  Patient reports that she has had 2 weeks now pain under her left breast.  She reports having to massage the area and it will improved.  She denies any positional or exertional symptoms.  Denies any fevers, chills, body aches.  Denies any shortness of breath, history of PE/DVT, cough, recent travel.  Denies any use of hormones.  Patient reports that she does have a primary care visit today for evaluation.  Her only cardiac risk factor is hypertension and she is compliant with her home medications.  Denies any history of early cardiac death in her family.      PCP: Beatrice Wu APRN - NP    No current facility-administered medications for this encounter.     Current Outpatient Medications   Medication Sig Dispense Refill    amLODIPine (NORVASC) 5 MG tablet Take 5 mg by mouth daily      aspirin 81 MG chewable tablet Take 81 mg by mouth daily      famotidine (PEPCID) 20 MG tablet Take 20 mg by mouth every evening      hydroCHLOROthiazide (HYDRODIURIL) 12.5 MG tablet Take 12.5 mg by mouth daily      neomycin-polymyxin-hydrocortisone 1 % SOLN otic solution Place 3-4 drops in ear(s) 4 times daily      potassium chloride (KLOR-CON M) 20 MEQ extended release tablet Take 20 mEq by mouth daily         Past History     Past Medical History:   Past Medical History:   Diagnosis Date    Dental caries     Hypertension     Insect bite     Migraine     Sore throat     Strep pharyngitis     Tooth ache        Past Surgical History:  Past Surgical History:   Procedure Laterality Date     SECTION         Family History:  Family History   Problem Relation Age of Onset    Hypertension Other     Diabetes

## 2025-03-23 ENCOUNTER — TRANSCRIBE ORDERS (OUTPATIENT)
Facility: HOSPITAL | Age: 40
End: 2025-03-23

## 2025-03-23 DIAGNOSIS — N60.11 DIFFUSE CYSTIC MASTOPATHY OF RIGHT BREAST: Primary | ICD-10-CM

## 2025-03-23 DIAGNOSIS — N60.12 DIFFUSE CYSTIC MASTOPATHY OF LEFT BREAST: ICD-10-CM

## 2025-03-25 ENCOUNTER — TRANSCRIBE ORDERS (OUTPATIENT)
Facility: HOSPITAL | Age: 40
End: 2025-03-25

## 2025-03-25 DIAGNOSIS — N60.11 FIBROCYSTIC DISEASE OF RIGHT BREAST: Primary | ICD-10-CM

## 2025-03-25 DIAGNOSIS — N60.12 FIBROCYSTIC DISEASE OF LEFT BREAST: ICD-10-CM

## 2025-04-01 ENCOUNTER — HOSPITAL ENCOUNTER (OUTPATIENT)
Facility: HOSPITAL | Age: 40
Discharge: HOME | End: 2025-04-04
Payer: MEDICAID

## 2025-04-01 VITALS — BODY MASS INDEX: 36.82 KG/M2 | WEIGHT: 195 LBS | HEIGHT: 61 IN

## 2025-04-01 DIAGNOSIS — N60.11 DIFFUSE CYSTIC MASTOPATHY OF RIGHT BREAST: ICD-10-CM

## 2025-04-01 DIAGNOSIS — N60.12 DIFFUSE CYSTIC MASTOPATHY OF LEFT BREAST: ICD-10-CM

## 2025-04-01 PROCEDURE — G0279 TOMOSYNTHESIS, MAMMO: HCPCS
